# Patient Record
Sex: MALE | Race: WHITE | Employment: FULL TIME | URBAN - METROPOLITAN AREA
[De-identification: names, ages, dates, MRNs, and addresses within clinical notes are randomized per-mention and may not be internally consistent; named-entity substitution may affect disease eponyms.]

---

## 2018-04-18 LAB
ALBUMIN SERPL-MCNC: 4.6 G/DL
ALP BLD-CCNC: 54 U/L
ALT SERPL-CCNC: 78 U/L
ANION GAP SERPL CALCULATED.3IONS-SCNC: NORMAL MMOL/L
AST SERPL-CCNC: 38 U/L
BILIRUB SERPL-MCNC: 0.2 MG/DL (ref 0.1–1.4)
BUN BLDV-MCNC: 8 MG/DL
CALCIUM SERPL-MCNC: 9 MG/DL
CHLORIDE BLD-SCNC: 98 MMOL/L
CHOLESTEROL, TOTAL: 176 MG/DL
CHOLESTEROL/HDL RATIO: ABNORMAL
CO2: 23 MMOL/L
CREAT SERPL-MCNC: 0.7 MG/DL
GFR CALCULATED: 125
GLUCOSE BLD-MCNC: 190 MG/DL
HDLC SERPL-MCNC: 32 MG/DL (ref 35–70)
LDL CHOLESTEROL CALCULATED: 73 MG/DL (ref 0–160)
POTASSIUM SERPL-SCNC: 4.2 MMOL/L
SODIUM BLD-SCNC: 137 MMOL/L
TOTAL PROTEIN: 7.4
TRIGL SERPL-MCNC: 353 MG/DL
VLDLC SERPL CALC-MCNC: 71 MG/DL

## 2019-01-06 ENCOUNTER — HOSPITAL ENCOUNTER (EMERGENCY)
Age: 34
Discharge: HOME | End: 2019-01-06
Payer: COMMERCIAL

## 2019-01-06 VITALS
SYSTOLIC BLOOD PRESSURE: 143 MMHG | RESPIRATION RATE: 16 BRPM | TEMPERATURE: 97.8 F | HEART RATE: 100 BPM | OXYGEN SATURATION: 95 % | BODY MASS INDEX: 36.06 KG/M2 | DIASTOLIC BLOOD PRESSURE: 97 MMHG | HEIGHT: 74 IN | WEIGHT: 281 LBS

## 2019-01-06 DIAGNOSIS — R22.0 LIP SWELLING: Primary | ICD-10-CM

## 2019-01-06 PROCEDURE — 6360000002 HC RX W HCPCS: Performed by: PHYSICIAN ASSISTANT

## 2019-01-06 PROCEDURE — 6370000000 HC RX 637 (ALT 250 FOR IP): Performed by: PHYSICIAN ASSISTANT

## 2019-01-06 PROCEDURE — 99283 EMERGENCY DEPT VISIT LOW MDM: CPT

## 2019-01-06 PROCEDURE — 96372 THER/PROPH/DIAG INJ SC/IM: CPT

## 2019-01-06 RX ORDER — FAMOTIDINE 20 MG/1
20 TABLET, FILM COATED ORAL 2 TIMES DAILY
Qty: 20 TABLET | Refills: 0 | Status: SHIPPED | OUTPATIENT
Start: 2019-01-06 | End: 2019-08-02

## 2019-01-06 RX ORDER — DIPHENHYDRAMINE HYDROCHLORIDE 50 MG/ML
50 INJECTION INTRAMUSCULAR; INTRAVENOUS ONCE
Status: COMPLETED | OUTPATIENT
Start: 2019-01-06 | End: 2019-01-06

## 2019-01-06 RX ORDER — PREDNISONE 20 MG/1
60 TABLET ORAL ONCE
Status: COMPLETED | OUTPATIENT
Start: 2019-01-06 | End: 2019-01-06

## 2019-01-06 RX ORDER — AMOXICILLIN 500 MG/1
500 CAPSULE ORAL 3 TIMES DAILY
Qty: 21 CAPSULE | Refills: 0 | Status: SHIPPED | OUTPATIENT
Start: 2019-01-06 | End: 2019-01-13

## 2019-01-06 RX ORDER — PREDNISONE 20 MG/1
60 TABLET ORAL DAILY
Qty: 12 TABLET | Refills: 0 | Status: SHIPPED | OUTPATIENT
Start: 2019-01-06 | End: 2019-01-10

## 2019-01-06 RX ORDER — DIPHENHYDRAMINE HCL 25 MG
25 CAPSULE ORAL EVERY 6 HOURS PRN
Qty: 20 CAPSULE | Refills: 0 | Status: SHIPPED | OUTPATIENT
Start: 2019-01-06 | End: 2019-01-16

## 2019-01-06 RX ADMIN — PREDNISONE 60 MG: 20 TABLET ORAL at 10:08

## 2019-01-06 RX ADMIN — DIPHENHYDRAMINE HYDROCHLORIDE 50 MG: 50 INJECTION, SOLUTION INTRAMUSCULAR; INTRAVENOUS at 10:08

## 2019-01-06 ASSESSMENT — PAIN DESCRIPTION - LOCATION: LOCATION: FACE

## 2019-01-06 ASSESSMENT — PAIN SCALES - GENERAL: PAINLEVEL_OUTOF10: 5

## 2019-01-06 ASSESSMENT — PAIN DESCRIPTION - PAIN TYPE: TYPE: ACUTE PAIN

## 2019-04-04 LAB
ESTIMATED AVERAGE GLUCOSE: 197.3 MG/DL
HBA1C MFR BLD: 8.5 %

## 2019-07-16 DIAGNOSIS — E78.5 HYPERLIPIDEMIA, UNSPECIFIED HYPERLIPIDEMIA TYPE: ICD-10-CM

## 2019-07-16 DIAGNOSIS — E66.01 CLASS 2 SEVERE OBESITY WITH SERIOUS COMORBIDITY AND BODY MASS INDEX (BMI) OF 36.0 TO 36.9 IN ADULT, UNSPECIFIED OBESITY TYPE (HCC): ICD-10-CM

## 2019-07-16 RX ORDER — LANCETS
1 EACH MISCELLANEOUS DAILY
COMMUNITY

## 2019-07-16 RX ORDER — ATENOLOL 25 MG/1
25 TABLET ORAL 2 TIMES DAILY
COMMUNITY
End: 2019-08-02 | Stop reason: SDUPTHER

## 2019-07-16 RX ORDER — LOSARTAN POTASSIUM 50 MG/1
50 TABLET ORAL 2 TIMES DAILY
COMMUNITY
End: 2019-07-29 | Stop reason: SDUPTHER

## 2019-07-29 ENCOUNTER — TELEPHONE (OUTPATIENT)
Dept: FAMILY MEDICINE CLINIC | Age: 34
End: 2019-07-29

## 2019-07-29 RX ORDER — LOSARTAN POTASSIUM 50 MG/1
50 TABLET ORAL DAILY
Qty: 30 TABLET | Refills: 0 | Status: SHIPPED | OUTPATIENT
Start: 2019-07-29 | End: 2019-07-30 | Stop reason: SDUPTHER

## 2019-07-29 RX ORDER — LOSARTAN POTASSIUM 50 MG/1
TABLET ORAL
Qty: 60 TABLET | Refills: 2 | Status: SHIPPED | OUTPATIENT
Start: 2019-07-29 | End: 2019-07-29 | Stop reason: SDUPTHER

## 2019-07-29 NOTE — TELEPHONE ENCOUNTER
----- Message from Bruna Catherine sent at 7/29/2019 11:11 AM EDT -----  Contact: [phone]  LOSARTAN 50 MG   METFORMIN 1000 MG      [facility]

## 2019-07-30 ENCOUNTER — TELEPHONE (OUTPATIENT)
Dept: FAMILY MEDICINE CLINIC | Age: 34
End: 2019-07-30

## 2019-07-30 RX ORDER — LOSARTAN POTASSIUM 50 MG/1
50 TABLET ORAL 2 TIMES DAILY
Qty: 60 TABLET | Refills: 0 | Status: SHIPPED | OUTPATIENT
Start: 2019-07-30 | End: 2019-08-02 | Stop reason: SDUPTHER

## 2019-07-30 NOTE — TELEPHONE ENCOUNTER
----- Message from Deborah Estrada sent at 7/30/2019 11:53 AM EDT -----  Contact: ETHAN/ANALIA  DIRECTIONS NEED CLARIFIED.  DID YOU DECREASE LOSARTAN?  [phone]

## 2019-07-31 ENCOUNTER — TELEPHONE (OUTPATIENT)
Dept: FAMILY MEDICINE CLINIC | Age: 34
End: 2019-07-31

## 2019-07-31 LAB
A/G RATIO: 2 (ref 1.1–2.2)
ALBUMIN SERPL-MCNC: 4.9 G/DL (ref 3.4–5)
ALP BLD-CCNC: 60 U/L (ref 40–129)
ALT SERPL-CCNC: 151 U/L (ref 10–40)
ANION GAP SERPL CALCULATED.3IONS-SCNC: 15 MMOL/L (ref 3–16)
AST SERPL-CCNC: 71 U/L (ref 15–37)
BILIRUB SERPL-MCNC: 0.4 MG/DL (ref 0–1)
BUN BLDV-MCNC: 12 MG/DL (ref 7–20)
CALCIUM SERPL-MCNC: 10 MG/DL (ref 8.3–10.6)
CHLORIDE BLD-SCNC: 99 MMOL/L (ref 99–110)
CHOLESTEROL, FASTING: 187 MG/DL (ref 0–199)
CO2: 22 MMOL/L (ref 21–32)
CREAT SERPL-MCNC: 0.5 MG/DL (ref 0.9–1.3)
GFR AFRICAN AMERICAN: >60
GFR NON-AFRICAN AMERICAN: >60
GLOBULIN: 2.5 G/DL
GLUCOSE FASTING: 297 MG/DL (ref 70–99)
HDLC SERPL-MCNC: 30 MG/DL (ref 40–60)
LDL CHOLESTEROL CALCULATED: ABNORMAL MG/DL
LDL CHOLESTEROL DIRECT: 112 MG/DL
POTASSIUM SERPL-SCNC: 4.6 MMOL/L (ref 3.5–5.1)
SODIUM BLD-SCNC: 136 MMOL/L (ref 136–145)
TOTAL PROTEIN: 7.4 G/DL (ref 6.4–8.2)
TRIGLYCERIDE, FASTING: 457 MG/DL (ref 0–150)
VLDLC SERPL CALC-MCNC: ABNORMAL MG/DL

## 2019-07-31 NOTE — TELEPHONE ENCOUNTER
----- Message from Melodie Hall sent at 7/31/2019 11:34 AM EDT -----  Contact: ADALBERTO   [phone]     [facility] ON [address]  VERIFY THE \"DIRECTIONS\" CHANGE FROM  LOSARTAN 50 MG BID-------TO SCRIPT  SAYING 50 MG QD.

## 2019-08-01 LAB
ESTIMATED AVERAGE GLUCOSE: 246 MG/DL
HBA1C MFR BLD: 10.2 %

## 2019-08-02 ENCOUNTER — OFFICE VISIT (OUTPATIENT)
Dept: FAMILY MEDICINE CLINIC | Age: 34
End: 2019-08-02
Payer: COMMERCIAL

## 2019-08-02 VITALS
SYSTOLIC BLOOD PRESSURE: 144 MMHG | DIASTOLIC BLOOD PRESSURE: 92 MMHG | HEIGHT: 73 IN | WEIGHT: 288.6 LBS | BODY MASS INDEX: 38.25 KG/M2 | HEART RATE: 76 BPM

## 2019-08-02 DIAGNOSIS — E66.01 CLASS 2 SEVERE OBESITY WITH SERIOUS COMORBIDITY AND BODY MASS INDEX (BMI) OF 36.0 TO 36.9 IN ADULT, UNSPECIFIED OBESITY TYPE (HCC): Primary | ICD-10-CM

## 2019-08-02 DIAGNOSIS — I10 ESSENTIAL HYPERTENSION: Chronic | ICD-10-CM

## 2019-08-02 DIAGNOSIS — E78.5 HYPERLIPIDEMIA, UNSPECIFIED HYPERLIPIDEMIA TYPE: ICD-10-CM

## 2019-08-02 DIAGNOSIS — E11.9 TYPE 2 DIABETES MELLITUS WITHOUT COMPLICATION, WITHOUT LONG-TERM CURRENT USE OF INSULIN (HCC): Chronic | ICD-10-CM

## 2019-08-02 PROCEDURE — 99214 OFFICE O/P EST MOD 30 MIN: CPT | Performed by: FAMILY MEDICINE

## 2019-08-02 RX ORDER — ATORVASTATIN CALCIUM 20 MG/1
20 TABLET, FILM COATED ORAL DAILY
Qty: 90 TABLET | Refills: 1 | Status: SHIPPED
Start: 2019-08-02 | End: 2020-05-29

## 2019-08-02 RX ORDER — LOSARTAN POTASSIUM 50 MG/1
50 TABLET ORAL 2 TIMES DAILY
Qty: 60 TABLET | Refills: 5 | Status: SHIPPED
Start: 2019-08-02 | End: 2020-05-29 | Stop reason: RX

## 2019-08-02 RX ORDER — ATENOLOL 25 MG/1
25 TABLET ORAL 2 TIMES DAILY
Qty: 60 TABLET | Refills: 5 | Status: SHIPPED | OUTPATIENT
Start: 2019-08-02 | End: 2020-05-21 | Stop reason: SDUPTHER

## 2019-08-02 ASSESSMENT — PATIENT HEALTH QUESTIONNAIRE - PHQ9
2. FEELING DOWN, DEPRESSED OR HOPELESS: 0
SUM OF ALL RESPONSES TO PHQ QUESTIONS 1-9: 0
SUM OF ALL RESPONSES TO PHQ9 QUESTIONS 1 & 2: 0
1. LITTLE INTEREST OR PLEASURE IN DOING THINGS: 0
SUM OF ALL RESPONSES TO PHQ QUESTIONS 1-9: 0

## 2019-08-02 ASSESSMENT — ENCOUNTER SYMPTOMS
SHORTNESS OF BREATH: 0
RESPIRATORY NEGATIVE: 1
ABDOMINAL PAIN: 0
CHEST TIGHTNESS: 0
COUGH: 0
WHEEZING: 0

## 2019-08-19 ENCOUNTER — TELEPHONE (OUTPATIENT)
Dept: FAMILY MEDICINE CLINIC | Age: 34
End: 2019-08-19

## 2019-08-30 RX ORDER — GLIMEPIRIDE 1 MG/1
1 TABLET ORAL
Qty: 30 TABLET | Refills: 2 | Status: SHIPPED | OUTPATIENT
Start: 2019-08-30 | End: 2019-09-26 | Stop reason: SDUPTHER

## 2019-09-26 ENCOUNTER — TELEPHONE (OUTPATIENT)
Dept: FAMILY MEDICINE CLINIC | Age: 34
End: 2019-09-26

## 2019-09-26 RX ORDER — GLIMEPIRIDE 1 MG/1
1 TABLET ORAL
Qty: 30 TABLET | Refills: 2 | Status: SHIPPED | OUTPATIENT
Start: 2019-09-26 | End: 2020-04-03

## 2020-05-08 RX ORDER — ATENOLOL 25 MG/1
TABLET ORAL
Qty: 60 TABLET | Refills: 4 | OUTPATIENT
Start: 2020-05-08

## 2020-05-15 RX ORDER — ATENOLOL 25 MG/1
TABLET ORAL
Qty: 60 TABLET | Refills: 4 | OUTPATIENT
Start: 2020-05-15

## 2020-05-21 RX ORDER — ATENOLOL 25 MG/1
25 TABLET ORAL 2 TIMES DAILY
Qty: 60 TABLET | Refills: 0 | Status: SHIPPED | OUTPATIENT
Start: 2020-05-21 | End: 2020-05-29 | Stop reason: SDUPTHER

## 2020-05-27 DIAGNOSIS — E11.9 TYPE 2 DIABETES MELLITUS WITHOUT COMPLICATION, WITHOUT LONG-TERM CURRENT USE OF INSULIN (HCC): Chronic | ICD-10-CM

## 2020-05-27 LAB
CHOLESTEROL, TOTAL: 176 MG/DL (ref 0–199)
HDLC SERPL-MCNC: 38 MG/DL (ref 40–60)
LDL CHOLESTEROL CALCULATED: 97 MG/DL
TRIGL SERPL-MCNC: 205 MG/DL (ref 0–150)
VLDLC SERPL CALC-MCNC: 41 MG/DL

## 2020-05-28 LAB
ESTIMATED AVERAGE GLUCOSE: 151.3 MG/DL
HBA1C MFR BLD: 6.9 %

## 2020-05-29 ENCOUNTER — OFFICE VISIT (OUTPATIENT)
Dept: FAMILY MEDICINE CLINIC | Age: 35
End: 2020-05-29
Payer: COMMERCIAL

## 2020-05-29 VITALS
OXYGEN SATURATION: 98 % | HEART RATE: 84 BPM | DIASTOLIC BLOOD PRESSURE: 90 MMHG | TEMPERATURE: 98.6 F | SYSTOLIC BLOOD PRESSURE: 130 MMHG | WEIGHT: 275.8 LBS | HEIGHT: 73 IN | BODY MASS INDEX: 36.55 KG/M2

## 2020-05-29 PROCEDURE — 90471 IMMUNIZATION ADMIN: CPT | Performed by: FAMILY MEDICINE

## 2020-05-29 PROCEDURE — 90732 PPSV23 VACC 2 YRS+ SUBQ/IM: CPT | Performed by: FAMILY MEDICINE

## 2020-05-29 PROCEDURE — 99214 OFFICE O/P EST MOD 30 MIN: CPT | Performed by: FAMILY MEDICINE

## 2020-05-29 RX ORDER — ATENOLOL 25 MG/1
25 TABLET ORAL 2 TIMES DAILY
Qty: 60 TABLET | Refills: 5 | Status: SHIPPED | OUTPATIENT
Start: 2020-05-29 | End: 2021-01-04

## 2020-05-29 RX ORDER — BLOOD-GLUCOSE METER
EACH MISCELLANEOUS
Qty: 1 KIT | Refills: 0 | Status: SHIPPED | OUTPATIENT
Start: 2020-05-29 | End: 2022-08-09

## 2020-05-29 RX ORDER — GLIMEPIRIDE 1 MG/1
TABLET ORAL
Qty: 30 TABLET | Refills: 5 | Status: SHIPPED | OUTPATIENT
Start: 2020-05-29 | End: 2020-12-03

## 2020-05-29 RX ORDER — LANCETS
EACH MISCELLANEOUS
Qty: 100 EACH | Refills: 5 | Status: SHIPPED | OUTPATIENT
Start: 2020-05-29

## 2020-05-29 RX ORDER — BLOOD SUGAR DIAGNOSTIC
1 STRIP MISCELLANEOUS DAILY
Qty: 50 EACH | Refills: 5 | Status: SHIPPED | OUTPATIENT
Start: 2020-05-29

## 2020-05-29 ASSESSMENT — ENCOUNTER SYMPTOMS
DIARRHEA: 0
COUGH: 0
ALLERGIC/IMMUNOLOGIC NEGATIVE: 1
SHORTNESS OF BREATH: 0
SORE THROAT: 0
RHINORRHEA: 0

## 2020-05-29 ASSESSMENT — PATIENT HEALTH QUESTIONNAIRE - PHQ9
2. FEELING DOWN, DEPRESSED OR HOPELESS: 0
1. LITTLE INTEREST OR PLEASURE IN DOING THINGS: 0
SUM OF ALL RESPONSES TO PHQ9 QUESTIONS 1 & 2: 0
SUM OF ALL RESPONSES TO PHQ QUESTIONS 1-9: 0
SUM OF ALL RESPONSES TO PHQ QUESTIONS 1-9: 0

## 2020-05-29 NOTE — PROGRESS NOTES
2020     Moises Godinez      Chief Complaint   Patient presents with    Other     Routine office visit    Discuss Labs     Drawn 20    Medication Refill     Pended    Medication Problem     Losartan is now unaval. he has not had it for about 6 months now       YAKOV Funes is a 29 y.o. male who presents today with the followin. Type 2 diabetes mellitus without complication, without long-term current use of insulin ([address]    2. Need for prophylactic vaccination against Streptococcus pneumoniae (pneumococcus)    3. Need for prophylactic vaccination against diphtheria-tetanus-pertussis (DTP)    4. Essential hypertension    5. Hyperlipidemia, unspecified hyperlipidemia type    6. Class 2 severe obesity with serious comorbidity and body mass index (BMI) of 36.0 to 36.9 in adult, unspecified obesity type ([address]    Here for follow-up of several medical problems  The patient is a type II diabetic  When he was here in the last 6 months his A1c was around 10  I try to get him on Trulicity and Januvia was not covered so I decided put on Amaryl 1 mg daily and has been on that in addition to the maximum dose of metformin  He is lost a little bit of weight  He does have any signs or symptoms of hypoglycemia he does not have polyuria or polydipsia  He does not check his blood sugars at home    REVIEW OF SYMPTOMS    Review of Systems   Constitutional: Positive for activity change. Negative for unexpected weight change. HENT: Negative for congestion, rhinorrhea and sore throat. Respiratory: Negative for cough and shortness of breath. Cardiovascular: Negative for chest pain. Gastrointestinal: Negative for diarrhea. Genitourinary: Negative for difficulty urinating. Allergic/Immunologic: Negative. Neurological: Negative. Psychiatric/Behavioral: Negative.         PAST MEDICAL HISTORY  Past Medical History:   Diagnosis Date    Diabetes mellitus ([address]     Hyperlipidemia     Hypertension     glucose test strips (ACCU-CHEK GUIDE) strip 1 each by In Vitro route daily As needed. 50 each 5    Accu-Chek Multiclix Lancets MISC Check blood sugar once per day 100 each 5    blood glucose test strips (ACCU-CHEK MERRICK PLUS) strip 1 each by In Vitro route daily as needed As needed.  ACCU-CHEK SOFTCLIX LANCETS MISC 1 each by Does not apply route daily       No current facility-administered medications for this visit. ALLERGIES  Allergies   Allergen Reactions    Orajel Mouth-Aid [Benzocaine] Swelling       PHYSICAL EXAM    BP (!) 130/90 (Site: Left Upper Arm, Position: Sitting, Cuff Size: Large Adult)   Pulse 84   Temp 98.6 °F (37 °C) (Temporal)   Ht 6' 0.5\" (1.842 m)   Wt 275 lb 12.8 oz (125.1 kg)   SpO2 98%   BMI 36.89 kg/m²     Physical Exam  Vitals signs and nursing note reviewed. Constitutional:       General: He is not in acute distress. HENT:      Right Ear: External ear normal.      Left Ear: External ear normal.   Eyes:      Conjunctiva/sclera: Conjunctivae normal.   Cardiovascular:      Rate and Rhythm: Normal rate. Pulmonary:      Effort: Pulmonary effort is normal. No respiratory distress. Musculoskeletal:      Right lower leg: No edema. Left lower leg: No edema. Skin:     General: Skin is warm and dry. Neurological:      General: No focal deficit present. Mental Status: He is alert. Mental status is at baseline.    Psychiatric:         Mood and Affect: Mood normal.     DIABETIC FOOT EXAM:    Visual inspection:      Deformity/amputation: absent  Skin lesions/pre-ulcerative calluses: absent  Edema: right- negative, left- negative    Sensory exam:  Monofilament sensation: normal  (minimum of 5 random plantar locations tested, avoiding callused areas - > 1 area with absence of sensation is + for neuropathy)    Plus at least one of the following:  Pulses: normal    A1c came back 6.9  Lipids are in the target range       ASSESSMENT and Alishachris Goinsbernie was seen today for other, discuss labs, medication refill and medication problem. Diagnoses and all orders for this visit:    Type 2 diabetes mellitus without complication, without long-term current use of insulin ([facility])  -      DIABETES FOOT EXAM  -     Blood Glucose Monitoring Suppl (ACCU-CHEK GUIDE) w/Device KIT; Use as directed  -     blood glucose test strips (ACCU-CHEK GUIDE) strip; 1 each by In Vitro route daily As needed. -     Accu-Chek Multiclix Lancets MISC; Check blood sugar once per day  -     Comprehensive Metabolic Panel; Future  -     Lipid Panel; Future  -     Hemoglobin A1C; Future  -     Microalbumin / Creatinine Urine Ratio; Future    Need for prophylactic vaccination against Streptococcus pneumoniae (pneumococcus)  -     Pneumococcal polysaccharide vaccine 23-valent PPSV23    Need for prophylactic vaccination against diphtheria-tetanus-pertussis (DTP)    Essential hypertension    Hyperlipidemia, unspecified hyperlipidemia type    Class 2 severe obesity with serious comorbidity and body mass index (BMI) of 36.0 to 36.9 in adult, unspecified obesity type ([facility]    Other orders  -     metFORMIN (GLUCOPHAGE) 1000 MG tablet; Take 1 tablet by mouth 2 times daily  -     glimepiride (AMARYL) 1 MG tablet; TAKE ONE TABLET BY MOUTH EVERY MORNING (BEFORE BREAKFAST)  -     atenolol (TENORMIN) 25 MG tablet; Take 1 tablet by mouth 2 times daily    Patient is improved significantly on his diabetes and should continue on the same medicines continue to try to lose weight  Continue same meds for the hypertension and hyperlipidemia follow-up in 6 months get labs prior to that office visit    Return in about 6 months (around 11/29/2020). Electronically signed by Kulwant Bruner MD on 5/29/2020    Please note that this chart was generated using dragon dictation software. Although every effort was made to ensure the accuracy of this automated transcription, some errors in transcription may have occurred.

## 2020-11-12 ENCOUNTER — HOSPITAL ENCOUNTER (OUTPATIENT)
Age: 35
Setting detail: SPECIMEN
Discharge: HOME | End: 2020-11-12
Payer: COMMERCIAL

## 2020-11-12 ENCOUNTER — OFFICE VISIT (OUTPATIENT)
Dept: FAMILY MEDICINE CLINIC | Age: 35
End: 2020-11-12
Payer: COMMERCIAL

## 2020-11-12 VITALS — TEMPERATURE: 97.9 F

## 2020-11-12 PROCEDURE — U0002 COVID-19 LAB TEST NON-CDC: HCPCS

## 2020-11-12 PROCEDURE — 99213 OFFICE O/P EST LOW 20 MIN: CPT | Performed by: PHYSICIAN ASSISTANT

## 2020-11-12 NOTE — PATIENT INSTRUCTIONS
Your COVID 19 test can take 3-5 days for the results come back. We ask that you make a Mychart page and view your test results this way. You will need to Self quarantine until you know your results. Increase fluids rest  Saline nasal spray as directed  Warm salt gargles for throat discomfort  Monitor temperature twice a day  Tylenol for fevers and/or discomfort. Mucinex as needed  If symptoms are worse -Go to the ER. Follow up with your primary doctor    To Whom it May Concern:    Teresa Johnson has been tested for COVID on 11/12/20. They may NOT return to work until their lab test results back and they been fever free for 3 days. If test is positive they must stay home for 2 weeks or until they test negative or as directed by the [facility] Department.

## 2020-11-12 NOTE — PROGRESS NOTES
11/12/20  Gavin Godinez  1985    FLU/COVID-19 CLINIC EVALUATION    HPI SYMPTOMS:    Employer:    [] Fevers  [x] Chills  [x] Cough  [] Coughing up blood  [] Chest Congestion  [x] Nasal Congestion  [] Feeling short of breath  [] Sometimes  [] Frequently  [] All the time  [] Chest pain  [x] Headaches  [x]Tolerable  [] Severe  [] Sore throat  [x] Muscle aches  [x] Nausea  [] Vomiting  []Unable to keep fluids down  [x] Diarrhea  []Severe    [x] OTHER SYMPTOMS: loss of taste and smell    Symptom Duration:   [] 1  [] 2   [] 3   [x] 4    [] 5   [] 6   [] 7   [] 8   [] 9   [] 10   [] 11   [] 12   [] 13   [] 14   [] Longer than 14 days    Symptom course:   [x] Worsening     [] Stable     [] Improving    RISK FACTORS:    [] Pregnant or possibly pregnant  [] Age over 61  [] Diabetes  [] Heart disease  [] Asthma  [] COPD/Other chronic lung diseases  [] Active Cancer  [] On Chemotherapy  [] Taking oral steroids  [] History Lymphoma/Leukemia  [] Close contact with a lab confirmed COVID-19 patient within 14 days of symptom onset  [] History of travel from affected geographical areas within 14 days of symptom onset       VITALS:  There were no vitals filed for this visit. TESTS:    POCT FLU:  [] Positive     []Negative    ASSESSMENT:    [] Flu  [] Possible COVID-19  [] Strep    PLAN:    [] Discharge home with written instructions for:  [] Flu management  [] Possible COVID-19 infection with self-quarantine and management of symptoms  [] Follow-up with primary care physician or emergency department if worsens  [] Evaluation per physician/NP/PA in clinic  [] Sent to ER       An  electronic signature was used to authenticate this note.      --Zainab Anna LPN on [id number]/5999 at 6:54 PM

## 2020-11-13 NOTE — PROGRESS NOTES
11/12/2020    HPI:  Chief complaint and history of present illness as per medical assistant/nurse documented today in the Flu/COVID-19 clinic. 4-day history of chills, cough, nasal congestion, mild generalized headache, muscle aches, nausea, diarrhea, and lack of taste and smell. He denies chest pain, shortness of breath, wheezing, vomiting, fever, melena or hematochezia. His girlfriend lives in his home and has had similar symptoms. He has also had known exposure to COVID-19 (friend) within the past 5 days. He has not tried any medications for his symptoms. MEDICATIONS:  Prior to Visit Medications    Medication Sig Taking? Authorizing Provider   metFORMIN (GLUCOPHAGE) 1000 MG tablet Take 1 tablet by mouth 2 times daily  Andree Connell MD   glimepiride (AMARYL) 1 MG tablet TAKE ONE TABLET BY MOUTH EVERY MORNING (BEFORE BREAKFAST)  Andree Connell MD   atenolol (TENORMIN) 25 MG tablet Take 1 tablet by mouth 2 times daily  Andree Connell MD   Blood Glucose Monitoring Suppl (ACCU-CHEK GUIDE) w/Device KIT Use as directed  Andree Connell MD   blood glucose test strips (ACCU-CHEK GUIDE) strip 1 each by In Vitro route daily As needed. Andree Connell MD   Accu-Chek Multiclix Lancets MISC Check blood sugar once per day  Andree Connell MD   blood glucose test strips (ACCU-CHEK MERRICK PLUS) strip 1 each by In Vitro route daily as needed As needed.   Historical Provider, MD   ACCU-CHEAUTUMN SOFTCLIX LANCETS MISC 1 each by Does not apply route daily  Historical Provider, MD       Allergies   Allergen Reactions    Orajel Mouth-Aid [Benzocaine] Swelling   ,   Past Medical History:   Diagnosis Date    Diabetes mellitus ([facility]     Hyperlipidemia     Hypertension     Obesity        PHYSICAL EXAM:  Physical Exam  Temp:  97.9 F  Heart Rate:  84    Pulse Ox:  98%    Constitutional:  Well developed, well nourished  HENT:  Normocephalic, atraumatic, bilateral external ears normal, bilateral TMs normal, oropharynx moist, post nasal drip noted, nasal mucosa congested, sinuses nontender  Eyes:  conjunctiva normal, no discharge, no scleral icterus  Cardiovascular:  Normal heart rate, normal rhythm, no murmurs, gallops or rubs  Thorax & Lungs:  Normal breath sounds, no respiratory distress, no wheezing  Skin:  Warm, dry, no erythema, no rash  Neurologic:  Alert & oriented   Psychiatric:  Affect normal, mood normal    ASSESSMENT/PLAN:  1. Flu-like symptoms  - COVID-19 Ambulatory    2. Exposure to COVID-19 virus    Patient was encouraged to rest and stay well-hydrated. Use over-the-counter medications for symptom relief such as Tylenol and Mucinex. Isolation measures discussed in detail. ED for any sudden changes. FOLLOW-UP:  No follow-ups on file.     In addition to other information, the printed after visit summary provided to the patient includes:  [x] COVID-19 Self care instructions  [x] COVID-19 General patient information    [facility]

## 2020-11-14 LAB
SARS-COV-2: DETECTED
SOURCE: ABNORMAL

## 2020-12-03 RX ORDER — GLIMEPIRIDE 1 MG/1
TABLET ORAL
Qty: 30 TABLET | Refills: 4 | Status: SHIPPED | OUTPATIENT
Start: 2020-12-03 | End: 2021-01-22 | Stop reason: SDUPTHER

## 2021-01-04 RX ORDER — ATENOLOL 25 MG/1
TABLET ORAL
Qty: 60 TABLET | Refills: 0 | Status: SHIPPED | OUTPATIENT
Start: 2021-01-04 | End: 2021-01-22 | Stop reason: SDUPTHER

## 2021-01-04 NOTE — TELEPHONE ENCOUNTER
Requested Prescriptions     Signed Prescriptions Disp Refills    atenolol (TENORMIN) 25 MG tablet 60 tablet 0     Sig: TAKE ONE TABLET BY MOUTH TWICE A DAY     Authorizing Provider: Hemalatha Hoffman     Ordering User: BILL Hernández    metFORMIN (GLUCOPHAGE) 1000 MG tablet 60 tablet 0     Sig: TAKE ONE TABLET BY MOUTH TWICE A DAY     Authorizing Provider: Hemalatha Hoffman     Ordering User: Yamil Marquez

## 2021-01-20 DIAGNOSIS — E11.9 TYPE 2 DIABETES MELLITUS WITHOUT COMPLICATION, WITHOUT LONG-TERM CURRENT USE OF INSULIN (HCC): Chronic | ICD-10-CM

## 2021-01-20 LAB
A/G RATIO: 1.7 (ref 1.1–2.2)
ALBUMIN SERPL-MCNC: 5 G/DL (ref 3.4–5)
ALP BLD-CCNC: 66 U/L (ref 40–129)
ALT SERPL-CCNC: 217 U/L (ref 10–40)
ANION GAP SERPL CALCULATED.3IONS-SCNC: 16 MMOL/L (ref 3–16)
AST SERPL-CCNC: 156 U/L (ref 15–37)
BILIRUB SERPL-MCNC: 0.6 MG/DL (ref 0–1)
BUN BLDV-MCNC: 8 MG/DL (ref 7–20)
CALCIUM SERPL-MCNC: 9.7 MG/DL (ref 8.3–10.6)
CHLORIDE BLD-SCNC: 98 MMOL/L (ref 99–110)
CHOLESTEROL, TOTAL: 189 MG/DL (ref 0–199)
CO2: 22 MMOL/L (ref 21–32)
CREAT SERPL-MCNC: 0.6 MG/DL (ref 0.9–1.3)
CREATININE URINE: 154.9 MG/DL (ref 39–259)
GFR AFRICAN AMERICAN: >60
GFR NON-AFRICAN AMERICAN: >60
GLOBULIN: 2.9 G/DL
GLUCOSE BLD-MCNC: 301 MG/DL (ref 70–99)
HDLC SERPL-MCNC: 30 MG/DL (ref 40–60)
LDL CHOLESTEROL CALCULATED: 101 MG/DL
MICROALBUMIN UR-MCNC: 27.1 MG/DL
MICROALBUMIN/CREAT UR-RTO: 175 MG/G (ref 0–30)
POTASSIUM SERPL-SCNC: 4 MMOL/L (ref 3.5–5.1)
SODIUM BLD-SCNC: 136 MMOL/L (ref 136–145)
TOTAL PROTEIN: 7.9 G/DL (ref 6.4–8.2)
TRIGL SERPL-MCNC: 291 MG/DL (ref 0–150)
VLDLC SERPL CALC-MCNC: 58 MG/DL

## 2021-01-21 LAB
ESTIMATED AVERAGE GLUCOSE: 260.4 MG/DL
HBA1C MFR BLD: 10.7 %

## 2021-01-22 ENCOUNTER — OFFICE VISIT (OUTPATIENT)
Dept: FAMILY MEDICINE CLINIC | Age: 36
End: 2021-01-22
Payer: COMMERCIAL

## 2021-01-22 VITALS
HEART RATE: 64 BPM | HEIGHT: 73 IN | SYSTOLIC BLOOD PRESSURE: 140 MMHG | WEIGHT: 278.8 LBS | BODY MASS INDEX: 36.95 KG/M2 | OXYGEN SATURATION: 95 % | TEMPERATURE: 98.1 F | DIASTOLIC BLOOD PRESSURE: 94 MMHG

## 2021-01-22 DIAGNOSIS — E11.9 TYPE 2 DIABETES MELLITUS WITHOUT COMPLICATION, WITHOUT LONG-TERM CURRENT USE OF INSULIN (HCC): Chronic | ICD-10-CM

## 2021-01-22 DIAGNOSIS — U07.1 COVID-19: ICD-10-CM

## 2021-01-22 DIAGNOSIS — I10 ESSENTIAL HYPERTENSION: Primary | Chronic | ICD-10-CM

## 2021-01-22 PROCEDURE — 99214 OFFICE O/P EST MOD 30 MIN: CPT | Performed by: FAMILY MEDICINE

## 2021-01-22 RX ORDER — GLIMEPIRIDE 2 MG/1
TABLET ORAL
Qty: 30 TABLET | Refills: 3 | Status: SHIPPED | OUTPATIENT
Start: 2021-01-22 | End: 2021-04-30 | Stop reason: SDUPTHER

## 2021-01-22 RX ORDER — ATENOLOL 25 MG/1
TABLET ORAL
Qty: 180 TABLET | Refills: 1 | Status: SHIPPED | OUTPATIENT
Start: 2021-01-22 | End: 2021-08-30

## 2021-01-22 ASSESSMENT — ENCOUNTER SYMPTOMS
SORE THROAT: 0
SHORTNESS OF BREATH: 0

## 2021-01-22 ASSESSMENT — PATIENT HEALTH QUESTIONNAIRE - PHQ9: 1. LITTLE INTEREST OR PLEASURE IN DOING THINGS: 0

## 2021-01-22 NOTE — PROGRESS NOTES
2021     Tamera Godinez      Chief Complaint   Patient presents with    6 Month Follow-Up    Discuss Labs     21    Medication Refill     Pended       HPI      Deysi Burton is a 28 y.o. male who presents today with the followin. Essential hypertension    2. Type 2 diabetes mellitus without complication, without long-term current use of insulin (HCC)    3. COVID-19    Routine follow-up  The patient is a type II diabetic      REVIEW OF SYMPTOMS    Review of Systems   Constitutional: Negative for activity change and unexpected weight change. Patient had COVID-19 infection symptoms were mild and has resolved   HENT: Negative for congestion and sore throat. Respiratory: Negative for shortness of breath. Cardiovascular:        History of essential hypertension responding well to his medicine  He has no angina and no known heart disease   Endocrine:        Diabetes type 2 which is currently not under control  He is trying to lose weight  He has any signs or symptoms of hypoglycemia  He was started on low-dose sulfonylurea after his last A1c       PAST MEDICAL HISTORY  Past Medical History:   Diagnosis Date    [facility] mellitus [facility]     Hyperlipidemia     Hypertension     Obesity        FAMILY HISTORY  Family History   Problem Relation Age of Onset    Diabetes Father     Hypertension Other        SOCIAL HISTORY  Social History     Socioeconomic History    Marital status:      Spouse name: None    Number of children: None    Years of education: None    Highest education level: None   Occupational History    None   Social Needs    Financial resource strain: None    Food insecurity     Worry: None     Inability: None    Transportation needs     Medical: None     Non-medical: None   Tobacco Use    Smoking status: Never Smoker    Smokeless tobacco: Never Used   Substance and Sexual Activity    Alcohol use:  Yes    Drug use: Yes     Types: Marijuana    Sexual activity: None Lifestyle    Physical activity     Days per week: None     Minutes per session: None    Stress: None   Relationships    Social connections     Talks on phone: None     Gets together: None     Attends Sabianism service: None     Active member of club or organization: None     Attends meetings of clubs or organizations: None     Relationship status: None    Intimate partner violence     Fear of current or ex partner: None     Emotionally abused: None     Physically abused: None     Forced sexual activity: None   Other Topics Concern    None   Social History Narrative    None        SURGICAL HISTORY  Past Surgical History:   Procedure Laterality Date    FRACTURE SURGERY      JOINT REPLACEMENT      WISDOM TOOTH EXTRACTION         CURRENT MEDICATIONS  Current Outpatient Medications   Medication Sig Dispense Refill    atenolol (TENORMIN) 25 MG tablet TAKE ONE TABLET BY MOUTH TWICE A DAY 60 tablet 0    metFORMIN (GLUCOPHAGE) 1000 MG tablet TAKE ONE TABLET BY MOUTH TWICE A DAY 60 tablet 0    glimepiride (AMARYL) 1 MG tablet TAKE ONE TABLET BY MOUTH EVERY MORNING BEFORE BREAKFAST 30 tablet 4    Blood Glucose Monitoring Suppl (ACCU-CHEK GUIDE) w/Device KIT Use as directed 1 kit 0    blood glucose test strips (ACCU-CHEK GUIDE) strip 1 each by In Vitro route daily As needed. 50 each 5    Accu-Chek Multiclix Lancets MISC Check blood sugar once per day 100 each 5    ACCU-CHEK SOFTCLIX LANCETS MISC 1 each by Does not apply route daily       No current facility-administered medications for this visit. ALLERGIES  Allergies   Allergen Reactions    Orajel Mouth-Aid [Benzocaine] Swelling       PHYSICAL EXAM    BP (!) 140/94 (Site: Right Upper Arm, Position: Sitting, Cuff Size: Large Adult)   Pulse 64   Temp 98.1 °F (36.7 °C) (Temporal)   Ht 6' 0.5\" (1.842 m)   Wt 278 lb 12.8 oz (126.5 kg)   SpO2 95%   BMI 37.29 kg/m²     Physical Exam  Vitals signs and nursing note reviewed.    Constitutional: Appearance: Normal appearance. Cardiovascular:      Rate and Rhythm: Normal rate. Pulmonary:      Effort: Pulmonary effort is normal. No respiratory distress. Neurological:      Mental Status: He is alert. Reviewed his labs done earlier this week  His A1c was disappointingly high at above 10    ASSESSMENT and Albino Nolen was seen today for 6 month follow-up, discuss labs and medication refill. Diagnoses and all orders for this visit:    Essential hypertension    Type 2 diabetes mellitus without complication, without long-term current use of insulin ([facility]    COVID-19    The patient cannot afford some of the newer pharmaceutical such as Marlyce Favor or Trulicity  I am going to increase the glimepiride to 2 mg daily  Watch for hypoglycemia  Make an effort to lose weight watch diet increase exercise  Recheck A1c in 3 months  Commend Covid vaccine when his age group is called    Return in about 3 months (around 4/22/2021). Electronically signed by Hector Wilks MD on 1/22/2021    Please note that this chart was generated using dragon dictation software. Although every effort was made to ensure the accuracy of this automated transcription, some errors in transcription may have occurred.

## 2021-04-30 ENCOUNTER — VIRTUAL VISIT (OUTPATIENT)
Dept: FAMILY MEDICINE CLINIC | Age: 36
End: 2021-04-30
Payer: COMMERCIAL

## 2021-04-30 DIAGNOSIS — U07.1 COVID-19: Primary | ICD-10-CM

## 2021-04-30 DIAGNOSIS — I10 ESSENTIAL HYPERTENSION: Chronic | ICD-10-CM

## 2021-04-30 DIAGNOSIS — E11.9 TYPE 2 DIABETES MELLITUS WITHOUT COMPLICATION, WITHOUT LONG-TERM CURRENT USE OF INSULIN (HCC): Chronic | ICD-10-CM

## 2021-04-30 DIAGNOSIS — E78.5 HYPERLIPIDEMIA, UNSPECIFIED HYPERLIPIDEMIA TYPE: ICD-10-CM

## 2021-04-30 DIAGNOSIS — E66.01 CLASS 2 SEVERE OBESITY WITH SERIOUS COMORBIDITY AND BODY MASS INDEX (BMI) OF 36.0 TO 36.9 IN ADULT, UNSPECIFIED OBESITY TYPE (HCC): ICD-10-CM

## 2021-04-30 PROCEDURE — 99213 OFFICE O/P EST LOW 20 MIN: CPT | Performed by: FAMILY MEDICINE

## 2021-04-30 RX ORDER — GLIMEPIRIDE 2 MG/1
TABLET ORAL
Qty: 90 TABLET | Refills: 1 | Status: SHIPPED | OUTPATIENT
Start: 2021-04-30 | End: 2021-12-08

## 2021-04-30 NOTE — PROGRESS NOTES
2021    TELEHEALTH EVALUATION -- [facility]Visual [facility]    HPI:    Ana Haynes (:  1985) has requested an audio/video evaluation for the following concern(s):    Virtual 3-month follow-up visit  The patient is diabetic  His treatment plan has been restricted because of cost issues and insurance coverage  He has been on Metformin and glimepiride  Glimepiride dose was increased after his last A1c which was above 10  He reports his fasting blood sugars are between 105 and 115 which is true would be markedly improved  He has any signs or symptoms or documentation of hypoglycemia  Review of Systems   Cardiovascular:        He is on beta-blocker for hypertension       Prior to Visit Medications    Medication Sig Taking? Authorizing Provider   atenolol (TENORMIN) 25 MG tablet TAKE ONE TABLET BY MOUTH TWICE A DAY Yes Claudio Mccracken MD   glimepiride (AMARYL) 2 MG tablet TAKE ONE TABLET BY MOUTH EVERY MORNING BEFORE BREAKFAST Yes Claudio Mccracken MD   metFORMIN (GLUCOPHAGE) 1000 MG tablet TAKE ONE TABLET BY MOUTH TWICE A DAY Yes Claudio Mccracken MD   Blood Glucose Monitoring Suppl (ACCU-CHEK GUIDE) w/Device KIT Use as directed Yes Claudio Mccracken MD   blood glucose test strips (ACCU-CHEK GUIDE) strip 1 each by In Vitro route daily As needed. Yes Claudio Mccracken MD   Accu-Chek Multiclix Lancets MISC Check blood sugar once per day Yes Claudio Mccracken MD   ACCU-CHEK SOFTCLIX LANCETS MISC 1 each by Does not apply route daily Yes Historical Provider, MD       Social History     Tobacco Use    Smoking status: Never Smoker    Smokeless tobacco: Never Used   Substance Use Topics    Alcohol use:  Yes    Drug use: Yes     Types: Marijuana            PHYSICAL EXAMINATION:  [ INSTRUCTIONS:  \"[x]\" Indicates a positive item  \"[]\" Indicates a negative item  -- DELETE ALL ITEMS NOT EXAMINED]  Vital Signs: (As obtained by patient/caregiver or practitioner observation)

## 2021-06-22 ENCOUNTER — TELEPHONE (OUTPATIENT)
Dept: FAMILY MEDICINE CLINIC | Age: 36
End: 2021-06-22

## 2021-07-20 ENCOUNTER — TELEPHONE (OUTPATIENT)
Dept: FAMILY MEDICINE CLINIC | Age: 36
End: 2021-07-20

## 2021-08-30 RX ORDER — ATENOLOL 25 MG/1
TABLET ORAL
Qty: 180 TABLET | Refills: 1 | Status: SHIPPED | OUTPATIENT
Start: 2021-08-30 | End: 2022-03-24

## 2021-08-30 NOTE — TELEPHONE ENCOUNTER
Pt needs soon as possible. He is out of these and is leaving for vacation on Wednesday at 6 am and needs these before.  Thank you

## 2021-08-31 RX ORDER — ATENOLOL 25 MG/1
TABLET ORAL
Qty: 180 TABLET | Refills: 1 | OUTPATIENT
Start: 2021-08-31

## 2021-08-31 RX ORDER — GLIMEPIRIDE 2 MG/1
TABLET ORAL
Qty: 90 TABLET | Refills: 1 | OUTPATIENT
Start: 2021-08-31

## 2021-12-08 RX ORDER — GLIMEPIRIDE 2 MG/1
TABLET ORAL
Qty: 14 TABLET | Refills: 0 | Status: SHIPPED | OUTPATIENT
Start: 2021-12-08 | End: 2022-03-31 | Stop reason: SDUPTHER

## 2022-03-24 RX ORDER — ATENOLOL 25 MG/1
TABLET ORAL
Qty: 14 TABLET | Refills: 0 | Status: SHIPPED | OUTPATIENT
Start: 2022-03-24 | End: 2022-03-31 | Stop reason: SDUPTHER

## 2022-03-30 DIAGNOSIS — E11.9 TYPE 2 DIABETES MELLITUS WITHOUT COMPLICATION, WITHOUT LONG-TERM CURRENT USE OF INSULIN (HCC): ICD-10-CM

## 2022-03-30 DIAGNOSIS — R79.89 LFT ELEVATION: ICD-10-CM

## 2022-03-30 DIAGNOSIS — E11.9 TYPE 2 DIABETES MELLITUS WITHOUT COMPLICATION, WITHOUT LONG-TERM CURRENT USE OF INSULIN (HCC): Primary | ICD-10-CM

## 2022-03-30 DIAGNOSIS — E78.5 HYPERLIPIDEMIA, UNSPECIFIED HYPERLIPIDEMIA TYPE: ICD-10-CM

## 2022-03-30 DIAGNOSIS — I10 ESSENTIAL HYPERTENSION: Chronic | ICD-10-CM

## 2022-03-30 LAB
BASOPHILS ABSOLUTE: 0 K/UL (ref 0–0.2)
BASOPHILS RELATIVE PERCENT: 0.7 %
CREATININE URINE: 94.9 MG/DL (ref 39–259)
EOSINOPHILS ABSOLUTE: 0.2 K/UL (ref 0–0.6)
EOSINOPHILS RELATIVE PERCENT: 2.2 %
HCT VFR BLD CALC: 45.7 % (ref 40.5–52.5)
HEMOGLOBIN: 15.8 G/DL (ref 13.5–17.5)
LYMPHOCYTES ABSOLUTE: 2.4 K/UL (ref 1–5.1)
LYMPHOCYTES RELATIVE PERCENT: 34.3 %
MCH RBC QN AUTO: 29 PG (ref 26–34)
MCHC RBC AUTO-ENTMCNC: 34.6 G/DL (ref 31–36)
MCV RBC AUTO: 83.9 FL (ref 80–100)
MICROALBUMIN UR-MCNC: 4.3 MG/DL
MICROALBUMIN/CREAT UR-RTO: 45.3 MG/G (ref 0–30)
MONOCYTES ABSOLUTE: 0.5 K/UL (ref 0–1.3)
MONOCYTES RELATIVE PERCENT: 6.7 %
NEUTROPHILS ABSOLUTE: 4 K/UL (ref 1.7–7.7)
NEUTROPHILS RELATIVE PERCENT: 56.1 %
PDW BLD-RTO: 14.2 % (ref 12.4–15.4)
PLATELET # BLD: 184 K/UL (ref 135–450)
PMV BLD AUTO: 9 FL (ref 5–10.5)
RBC # BLD: 5.44 M/UL (ref 4.2–5.9)
WBC # BLD: 7.1 K/UL (ref 4–11)

## 2022-03-31 ENCOUNTER — OFFICE VISIT (OUTPATIENT)
Dept: FAMILY MEDICINE CLINIC | Age: 37
End: 2022-03-31
Payer: COMMERCIAL

## 2022-03-31 VITALS
WEIGHT: 258 LBS | SYSTOLIC BLOOD PRESSURE: 164 MMHG | DIASTOLIC BLOOD PRESSURE: 112 MMHG | HEART RATE: 90 BPM | BODY MASS INDEX: 34.19 KG/M2 | OXYGEN SATURATION: 95 % | HEIGHT: 73 IN

## 2022-03-31 DIAGNOSIS — R74.01 TRANSAMINITIS: ICD-10-CM

## 2022-03-31 DIAGNOSIS — I10 ESSENTIAL HYPERTENSION: Primary | ICD-10-CM

## 2022-03-31 DIAGNOSIS — E11.9 TYPE 2 DIABETES MELLITUS WITHOUT COMPLICATION, WITHOUT LONG-TERM CURRENT USE OF INSULIN (HCC): ICD-10-CM

## 2022-03-31 DIAGNOSIS — R79.89 LFT ELEVATION: ICD-10-CM

## 2022-03-31 DIAGNOSIS — E78.5 HYPERLIPIDEMIA, UNSPECIFIED HYPERLIPIDEMIA TYPE: ICD-10-CM

## 2022-03-31 DIAGNOSIS — E11.9 TYPE 2 DIABETES MELLITUS WITHOUT COMPLICATION, WITHOUT LONG-TERM CURRENT USE OF INSULIN (HCC): Primary | ICD-10-CM

## 2022-03-31 LAB
A/G RATIO: 2.2 (ref 1.1–2.2)
ALBUMIN SERPL-MCNC: 5 G/DL (ref 3.4–5)
ALP BLD-CCNC: 65 U/L (ref 40–129)
ALT SERPL-CCNC: 149 U/L (ref 10–40)
ANION GAP SERPL CALCULATED.3IONS-SCNC: 20 MMOL/L (ref 3–16)
AST SERPL-CCNC: 79 U/L (ref 15–37)
BILIRUB SERPL-MCNC: 0.3 MG/DL (ref 0–1)
BUN BLDV-MCNC: 9 MG/DL (ref 7–20)
CALCIUM SERPL-MCNC: 9.8 MG/DL (ref 8.3–10.6)
CHLORIDE BLD-SCNC: 99 MMOL/L (ref 99–110)
CHOLESTEROL, FASTING: 211 MG/DL (ref 0–199)
CO2: 21 MMOL/L (ref 21–32)
CREAT SERPL-MCNC: 0.5 MG/DL (ref 0.9–1.3)
GFR AFRICAN AMERICAN: >60
GFR NON-AFRICAN AMERICAN: >60
GLUCOSE BLD-MCNC: 292 MG/DL (ref 70–99)
HBA1C MFR BLD: 10.3 %
HDLC SERPL-MCNC: 33 MG/DL (ref 40–60)
HEPATITIS C ANTIBODY INTERPRETATION: NORMAL
LDL CHOLESTEROL CALCULATED: ABNORMAL MG/DL
LDL CHOLESTEROL DIRECT: 124 MG/DL
POTASSIUM SERPL-SCNC: 4 MMOL/L (ref 3.5–5.1)
SODIUM BLD-SCNC: 140 MMOL/L (ref 136–145)
TOTAL PROTEIN: 7.3 G/DL (ref 6.4–8.2)
TRIGLYCERIDE, FASTING: 383 MG/DL (ref 0–150)
VLDLC SERPL CALC-MCNC: ABNORMAL MG/DL

## 2022-03-31 PROCEDURE — A4663 DIALYSIS BLOOD PRESSURE CUFF: HCPCS | Performed by: STUDENT IN AN ORGANIZED HEALTH CARE EDUCATION/TRAINING PROGRAM

## 2022-03-31 PROCEDURE — 99214 OFFICE O/P EST MOD 30 MIN: CPT | Performed by: STUDENT IN AN ORGANIZED HEALTH CARE EDUCATION/TRAINING PROGRAM

## 2022-03-31 PROCEDURE — 3046F HEMOGLOBIN A1C LEVEL >9.0%: CPT | Performed by: STUDENT IN AN ORGANIZED HEALTH CARE EDUCATION/TRAINING PROGRAM

## 2022-03-31 RX ORDER — ATENOLOL 25 MG/1
TABLET ORAL
Qty: 180 TABLET | Refills: 1 | Status: SHIPPED | OUTPATIENT
Start: 2022-03-31 | End: 2022-08-09 | Stop reason: SDUPTHER

## 2022-03-31 RX ORDER — GLIMEPIRIDE 2 MG/1
TABLET ORAL
Qty: 90 TABLET | Refills: 1 | Status: SHIPPED | OUTPATIENT
Start: 2022-03-31 | End: 2022-06-06 | Stop reason: ALTCHOICE

## 2022-03-31 ASSESSMENT — ENCOUNTER SYMPTOMS
NAUSEA: 0
WHEEZING: 0
SORE THROAT: 0
SHORTNESS OF BREATH: 0
ABDOMINAL PAIN: 0

## 2022-03-31 ASSESSMENT — PATIENT HEALTH QUESTIONNAIRE - PHQ9
SUM OF ALL RESPONSES TO PHQ QUESTIONS 1-9: 0
SUM OF ALL RESPONSES TO PHQ QUESTIONS 1-9: 0
1. LITTLE INTEREST OR PLEASURE IN DOING THINGS: 0
SUM OF ALL RESPONSES TO PHQ9 QUESTIONS 1 & 2: 0
2. FEELING DOWN, DEPRESSED OR HOPELESS: 0
SUM OF ALL RESPONSES TO PHQ QUESTIONS 1-9: 0
SUM OF ALL RESPONSES TO PHQ QUESTIONS 1-9: 0

## 2022-03-31 NOTE — PROGRESS NOTES
4/12/2022    Verna Fausto Godinez    Chief Complaint   Patient presents with   [address]     previous patient of dr. Yue Mccarty, routine office visit for medicaiton refills    Discuss Labs     patient states he has not been taking glimepiride for a few months, wants to know what he needs to do to bring his A1c down       HPI  History was obtained from patient. Ben Cortés is a 39 y.o. male with a PMHx as listed below who presents today follow up on chronic conditions. No acute complaints. Compliant with medications    Elevation in bp today, patient admits to poor complaincne    2d week 4-5 drink alcohol use. Discussed with patient to decrease use  Labs revewied elevation in LFTs, trending down    Patient admits to poor compliant to glimepiride    Patient has glucose monitor at home he states 150-160 in the mornings it will level out to 90s      1. Essential hypertension    2. Hyperlipidemia, unspecified hyperlipidemia type    3. Type 2 diabetes mellitus without complication, without long-term current use of insulin ([address]    4. LFT elevation    5. Transaminitis             REVIEW OF SYMPTOMS    Review of Systems   Constitutional: Negative for chills and fatigue. HENT: Negative for congestion and sore throat. Respiratory: Negative for shortness of breath and wheezing. Cardiovascular: Negative for chest pain and palpitations. Gastrointestinal: Negative for abdominal pain and nausea. Genitourinary: Negative for frequency and urgency. Neurological: Negative for light-headedness.        PAST MEDICAL HISTORY  Past Medical History:   Diagnosis Date    Diabetes mellitus ([address]     Hyperlipidemia     Hypertension     Obesity        FAMILY HISTORY  Family History   Problem Relation Age of Onset    Diabetes Father     Hypertension Other        SOCIAL HISTORY  Social History     Socioeconomic History    Marital status:      Spouse name: None    Number of children: None    Years of education: None    Highest education level: None   Occupational History    None   Tobacco Use    Smoking status: Never Smoker    Smokeless tobacco: Never Used   Substance and Sexual Activity    Alcohol use: Yes    Drug use: Yes     Types: Marijuana Elfida Schwarz)    Sexual activity: None   Other Topics Concern    None   Social History Narrative    None     Social Determinants of Health     Financial Resource Strain:     Difficulty of Paying Living Expenses: Not on file   Food Insecurity:     Worried About Running Out of Food in the Last Year: Not on file    [place] of Food in the Last Year: Not on file   Transportation Needs:     Lack of Transportation (Medical): Not on file    Lack of Transportation (Non-Medical):  Not on file   Physical Activity:     Days of Exercise per Week: Not on file    Minutes of Exercise per Session: Not on file   Stress:     Feeling of Stress : Not on file   Social Connections:     Frequency of Communication with Friends and Family: Not on file    Frequency of Social Gatherings with Friends and Family: Not on file    Attends Jainism Services: Not on file    Active Member of [address] [place] [address] or Organizations: Not on file    Attends Club or Organization Meetings: Not on file    Marital Status: Not on file   Intimate Partner Violence:     Fear of Current or Ex-Partner: Not on file    Emotionally Abused: Not on file    Physically Abused: Not on file    Sexually Abused: Not on file   Housing Stability:     Unable to Pay for Housing in the Last Year: Not on file    Number of [place] in the Last Year: Not on file    Unstable Housing in the Last Year: Not on file        SURGICAL HISTORY  Past Surgical History:   Procedure Laterality Date    FRACTURE SURGERY      JOINT REPLACEMENT      WISDOM TOOTH EXTRACTION                   CURRENT MEDICATIONS  Current Outpatient Medications   Medication Sig Dispense Refill    metFORMIN (GLUCOPHAGE) 1000 MG tablet TAKE ONE TABLET BY MOUTH TWICE A  tablet 1    glimepiride (AMARYL) 2 MG tablet TAKE ONE TABLET BY MOUTH EVERY MORNING BEFORE BREAKFAST. 90 tablet 1    atenolol (TENORMIN) 25 MG tablet TAKE ONE TABLET BY MOUTH TWICE A  tablet 1    Dulaglutide 0.75 MG/0.5ML SOPN Inject 0.75 mg into the skin once a week 3 pen 2    Blood Glucose Monitoring Suppl (ACCU-CHEK GUIDE) w/Device KIT Use as directed 1 kit 0    blood glucose test strips (ACCU-CHEK GUIDE) strip 1 each by In Vitro route daily As needed. 50 each 5    Accu-Chek Multiclix Lancets MISC Check blood sugar once per day 100 each 5    ACCU-CHEK SOFTCLIX LANCETS MISC 1 each by Does not apply route daily       No current facility-administered medications for this visit. ALLERGIES  Allergies   Allergen Reactions    Orajel Mouth-Aid [Benzocaine] Swelling       PHYSICAL EXAM    BP (!) 164/112   Pulse 90   Ht 6' 0.5\" (1.842 m)   Wt 258 lb (117 kg)   SpO2 95%   BMI 34.51 kg/m²     Physical Exam  Constitutional:       Appearance: Normal appearance. HENT:      Head: Normocephalic and atraumatic. Eyes:      Extraocular Movements: Extraocular movements intact. Pupils: Pupils are equal, round, and reactive to light. Cardiovascular:      Rate and Rhythm: Normal rate and regular rhythm. Pulses: Normal pulses. Heart sounds: No murmur heard. No friction rub. No gallop. Pulmonary:      Effort: Pulmonary effort is normal.      Breath sounds: Normal breath sounds. Skin:     General: Skin is warm and dry. Neurological:      General: No focal deficit present. Mental Status: He is alert. Psychiatric:         Mood and Affect: Mood normal.         Behavior: Behavior normal.         ASSESSMENT & PLAN    1. Essential hypertension  bp elevated discussed to monitor at home, f/u in 4 weeks  - atenolol (TENORMIN) 25 MG tablet; TAKE ONE TABLET BY MOUTH TWICE A DAY  Dispense: 180 tablet; Refill: 1  - [facility] BLOOD PRESSURE CUFF    2.  Hyperlipidemia, unspecified hyperlipidemia type  -elevated, however ascvd lower end, [place] diet/lifestyle, ASCVD 4.5%    The ASCVD Risk score (rEica Kat., et al., 2013) failed to calculate for the following reasons: The 2013 ASCVD risk score is only valid for ages 36 to 78      3. Type 2 diabetes mellitus without complication, without long-term current use of insulin (HCC)  Restart meds  - metFORMIN (GLUCOPHAGE) 1000 MG tablet; TAKE ONE TABLET BY MOUTH TWICE A DAY  Dispense: 180 tablet; Refill: 1  - glimepiride (AMARYL) 2 MG tablet; TAKE ONE TABLET BY MOUTH EVERY MORNING BEFORE BREAKFAST. Dispense: 90 tablet; Refill: 1  - Dulaglutide 0.75 MG/0.5ML SOPN; Inject 0.75 mg into the skin once a week  Dispense: 3 pen; Refill: 2    4. LFT elevation  -trending down obtain u/s GI referral discussed to decrease alcohol use  - US ABDOMINAL LIMITED; Future  - External Referral To Gastroenterology  - Chad Clarke CNP, Gastroenterology, [place]    5. Transaminitis  - Filippo Moeller, Gastroenterology, [facility]      Return in about 4 weeks (around 4/28/2022) for bp, dm2.          Electronically signed by Josselin Diaz DO on 4/12/2022

## 2022-04-04 ENCOUNTER — TELEPHONE (OUTPATIENT)
Dept: GASTROENTEROLOGY | Age: 37
End: 2022-04-04

## 2022-04-04 NOTE — RESULT ENCOUNTER NOTE
Please discuss with patient labs show elevation in cholesterol/triglycerides. (ASCVD 4.5%) Please encourage 1-2 pound weight loss a week. It is important he schedules appointment with GI. LFT still elevated but improved. Limit alcohol use.

## 2022-04-04 NOTE — TELEPHONE ENCOUNTER
Called pt. In regards to a referral for elevated LFTs and transaminitis. Lm for pt to call back and make an appt.

## 2022-04-11 ENCOUNTER — TELEPHONE (OUTPATIENT)
Dept: GASTROENTEROLOGY | Age: 37
End: 2022-04-11

## 2022-04-18 ENCOUNTER — TELEPHONE (OUTPATIENT)
Dept: GASTROENTEROLOGY | Age: 37
End: 2022-04-18

## 2022-04-18 NOTE — TELEPHONE ENCOUNTER
Called pt. For the 3rd time In regards to a referral for elevated LFTs and transaminitis.  Lm for pt to call back and make an appt.

## 2022-04-28 ENCOUNTER — TELEPHONE (OUTPATIENT)
Dept: FAMILY MEDICINE CLINIC | Age: 37
End: 2022-04-28

## 2022-04-28 NOTE — TELEPHONE ENCOUNTER
----- Message from [place] sent at 4/28/2022  3:34 PM EDT -----  Subject: Appointment Request    Reason for Call: Routine Existing Condition Follow Up (Diabetes)    QUESTIONS  Type of Appointment? Established Patient  Reason for appointment request? Available appointments did not meet   patient need  Additional Information for Provider? Pt had to reschedule appointment   5/2/22 due to work. He was to be seen within a month for a follow up.   ---------------------------------------------------------------------------  --------------  [employer]  What is the best way for the office to contact you? OK to leave message on   voicemail  Preferred Call Back Phone Number? [phone]  ---------------------------------------------------------------------------  --------------  SCRIPT ANSWERS  Relationship to Patient? Self  Have your symptoms changed? No  Is this follow up request related to routine Diabetes Management? Yes  Have you been diagnosed with, awaiting test results for, or told that you   are suspected of having COVID-19 (Coronavirus)? (If patient has tested   negative or was tested as a requirement for work, school, or travel and   not based on symptoms, answer no)? No  Within the past 10 days have you developed any of the following symptoms   (answer no if symptoms have been present longer than 10 days or began   more than 10 days ago)? Fever or Chills, Cough, Shortness of breath or   difficulty breathing, Loss of taste or smell, Sore throat, Nasal   congestion, Sneezing or runny nose, Fatigue or generalized body aches   (answer no if pain is specific to a body part e.g. back pain), Diarrhea,   Headache? No  Have you had close contact with someone with COVID-19 in the last 7 days? No  (Service Expert  click yes below to proceed with [facility] As Usual   Scheduling)?  Yes

## 2022-05-02 ENCOUNTER — TELEPHONE (OUTPATIENT)
Dept: FAMILY MEDICINE CLINIC | Age: 37
End: 2022-05-02

## 2022-05-02 NOTE — TELEPHONE ENCOUNTER
Please let pt know that this will need to be an appointment to be able to properly evaluate testing needs.    Breonna Win, BHUPINDER - CNP

## 2022-05-02 NOTE — TELEPHONE ENCOUNTER
Spoke with pt per Mayi note. Pt seemed unsure of the process. Pt will speak further with his wife and return call.

## 2022-05-02 NOTE — TELEPHONE ENCOUNTER
----- Message from Elvin Spears sent at 5/2/2022 10:28 AM EDT -----  Subject: Message to Provider    QUESTIONS  Information for Provider? Patient is calling because he would like to know   if he could get a order for a sperm count test. Please advise  ---------------------------------------------------------------------------  --------------  CALL BACK INFO  What is the best way for the office to contact you? OK to leave message on   voicemail  Preferred Call Back Phone Number? [phone]  ---------------------------------------------------------------------------  --------------  SCRIPT ANSWERS  Relationship to Patient?  Self

## 2022-06-02 ENCOUNTER — HOSPITAL ENCOUNTER (OUTPATIENT)
Dept: ULTRASOUND IMAGING | Age: 37
Discharge: HOME | End: 2022-06-02
Payer: COMMERCIAL

## 2022-06-02 DIAGNOSIS — R79.89 LFT ELEVATION: ICD-10-CM

## 2022-06-02 PROCEDURE — 76705 ECHO EXAM OF ABDOMEN: CPT

## 2022-06-03 DIAGNOSIS — R16.0 ENLARGEMENT OF LIVER: ICD-10-CM

## 2022-06-03 DIAGNOSIS — R74.01 TRANSAMINITIS: Primary | ICD-10-CM

## 2022-06-06 ENCOUNTER — OFFICE VISIT (OUTPATIENT)
Dept: GASTROENTEROLOGY | Age: 37
End: 2022-06-06
Payer: COMMERCIAL

## 2022-06-06 VITALS
SYSTOLIC BLOOD PRESSURE: 142 MMHG | HEIGHT: 72 IN | HEART RATE: 64 BPM | DIASTOLIC BLOOD PRESSURE: 90 MMHG | BODY MASS INDEX: 35 KG/M2 | TEMPERATURE: 97.2 F | WEIGHT: 258.4 LBS | OXYGEN SATURATION: 98 %

## 2022-06-06 DIAGNOSIS — K76.0 FATTY LIVER: ICD-10-CM

## 2022-06-06 DIAGNOSIS — R74.8 ELEVATED LIVER ENZYMES: Primary | ICD-10-CM

## 2022-06-06 PROCEDURE — 99203 OFFICE O/P NEW LOW 30 MIN: CPT | Performed by: NURSE PRACTITIONER

## 2022-06-06 ASSESSMENT — ENCOUNTER SYMPTOMS
BLOOD IN STOOL: 0
SHORTNESS OF BREATH: 0
NAUSEA: 0
BACK PAIN: 0
VOMITING: 0
EYE PAIN: 0
ABDOMINAL PAIN: 0
COLOR CHANGE: 0
WHEEZING: 0
COUGH: 0
DIARRHEA: 0
CONSTIPATION: 0
EYE DISCHARGE: 0

## 2022-06-06 NOTE — PROGRESS NOTES
Sandra Ta 39 y.o. male was seen by WILLIAMS Soliz on 6/6/2022     Wt Readings from Last 3 Encounters:   06/06/22 258 lb 6.4 oz (117.2 kg)   03/31/22 258 lb (117 kg)   01/22/21 278 lb 12.8 oz (126.5 kg)       HPI  Sandra Ta is a pleasant 39 y.o.  male who presents today for elevated liver enzymes and fatty liver. He has a past medical history of diabetes mellitus, hyperlipidemia, hypertension, and obesity. His abdominal ultrasound done on 6-2-2022 showed hepatomegaly with diffuse fatty infiltration of the liver otherwise normal results. He has no known liver disease or family history of liver disease. Rare NSAID use. He drinks six to seven beers three nights a week. His lab work done on 3- showed Glucose 292, Total Bilirubin 0.3, Alkaline Phosphatase 65, , and AST 79. His appetite is good without early satiety. His weight is stable. He is working on weight loss with diet changes and portion control. No nausea of vomiting. He has Intermittent heartburn. No nocturnal awakenings with acid reflux. No dysphagia or pain with swallowing. No abdominal pain, bloating or distention. No excess belching or flatulence. He denies changes in his bowel pattern. His typical bowel pattern is two to three times daily with soft brown formed stools. No diarrhea or constipation. No blood in his stool or melena. No family history of stomach or colon cancer. ROS  Review of Systems   Constitutional: Negative for appetite change, chills, diaphoresis, fatigue, fever and unexpected weight change. HENT: Negative for ear pain and hearing loss. Eyes: Negative for pain, discharge and visual disturbance. Respiratory: Negative for cough, shortness of breath and wheezing. Cardiovascular: Negative for chest pain, palpitations and leg swelling. Gastrointestinal: Negative for abdominal pain, blood in stool, constipation, diarrhea, nausea and vomiting.    Endocrine: Negative for cold intolerance and heat intolerance. Genitourinary: Negative for dysuria, frequency, hematuria and urgency. Musculoskeletal: Negative for back pain, myalgias and neck pain. Skin: Negative for color change, pallor and rash. Allergic/Immunologic: Negative for environmental allergies and food allergies. Neurological: Negative for dizziness, seizures, weakness and headaches. Hematological: Does not bruise/bleed easily. Psychiatric/Behavioral: Negative for dysphoric mood and sleep disturbance. The patient is not nervous/anxious. Allergies  Allergies   Allergen Reactions    Orajel Mouth-Aid [Benzocaine] Swelling       Medications  Current Outpatient Medications   Medication Sig Dispense Refill    metFORMIN (GLUCOPHAGE) 1000 MG tablet TAKE ONE TABLET BY MOUTH TWICE A  tablet 1    atenolol (TENORMIN) 25 MG tablet TAKE ONE TABLET BY MOUTH TWICE A  tablet 1    Dulaglutide 0.75 MG/0.5ML SOPN Inject 0.75 mg into the skin once a week 3 pen 2    Blood Glucose Monitoring Suppl (ACCU-CHEK GUIDE) w/Device KIT Use as directed 1 kit 0    blood glucose test strips (ACCU-CHEK GUIDE) strip 1 each by In Vitro route daily As needed. 50 each 5    Accu-Chek Multiclix Lancets MISC Check blood sugar once per day 100 each 5    ACCU-CHEK SOFTCLIX LANCETS MISC 1 each by Does not apply route daily       No current facility-administered medications for this visit. Past medical history:   He has a past medical history of Diabetes mellitus ([address] Hyperlipidemia, Hypertension, and Obesity. Past surgical history:  He has a past surgical history that includes joint replacement; fracture surgery; and [place] tooth extraction. Social History:  He reports that he has never smoked. He has never used smokeless tobacco. He reports current alcohol use. He reports current drug use. Drug: Marijuana Lana November).     Family history:  His family history includes Diabetes in his father; Hypertension in an other family member. Objective    Vitals:    06/06/22 1501   BP: (!) 142/90   Pulse:    Temp:    SpO2:         Physical exam    Physical Exam  Constitutional:       General: He is not in acute distress. Appearance: He is well-developed. He is obese. He is not ill-appearing, toxic-appearing or diaphoretic. HENT:      Head: Normocephalic and atraumatic. Nose: Nose normal.      Mouth/Throat:      Mouth: Mucous membranes are moist.   Cardiovascular:      Rate and Rhythm: Normal rate and regular rhythm. Pulses: Normal pulses. Heart sounds: Normal heart sounds. No murmur heard. No gallop. Pulmonary:      Effort: Pulmonary effort is normal. No respiratory distress. Breath sounds: Normal breath sounds. No stridor. No wheezing or rhonchi. Abdominal:      General: Bowel sounds are normal. There is no distension. Palpations: Abdomen is soft. There is no mass. Tenderness: There is no abdominal tenderness. Hernia: No hernia is present. Musculoskeletal:         General: Normal range of motion. Cervical back: Neck supple. Skin:     General: Skin is warm and dry. Neurological:      Mental Status: He is alert and oriented to person, place, and time. Psychiatric:         Mood and Affect: Mood normal.         No visits with results within 2 Month(s) from this visit. Latest known visit with results is:   Abstract on 03/31/2022   Component Date Value Ref Range Status    Hemoglobin A1C 03/31/2022 10.3  % Final       Assessment and Plan:  1. Elevated liver enzymes most likely due to fatty liver and/or alcohol use. Will order chronic liver lab work up to rule out autoimmune hepatitis, Hepatitis A, B and C, etc.  The patient was encouraged to avoid alcohol and recommend weight loss with low fat diet and daily exercise. 2.  Fatty liver noted on abdominal ultrasound due to obesity from excess calories/metabolic syndrome.   His Fib 4 index is 1.27 which is negative predictive value for advanced fibrosis. The patient was encouraged to lose at least 10-15% of his body weight. The patient was provided with information on fatty liver and liver disease diet. 3.  The patient was encouraged to follow-up in 2 months. Total time:  30 minutes.

## 2022-06-06 NOTE — PATIENT INSTRUCTIONS
Patient Education        Liver Disease Diet: Care Instructions  Overview     The liver does many jobs that are vital to the rest of your body. Whensomething is wrong with the liver, your body may not get the nutrition it needs. It is important that you eat a healthy diet that includes a variety of foods from the basic food groups: grains, vegetables, fruits, dairy, and protein foods. Follow your doctor's instructions for eating carbohydrate, protein, and fat in the right amounts for you. Your doctor also may limit salt or take salt out of your diet to help protect the liver. Always talk with your doctor ordietitian before you make changes in your diet. Follow-up care is a key part of your treatment and safety. Be sure to make and go to all appointments, and call your doctor if you are having problems. It's also a good idea to know your test results and keep alist of the medicines you take. How can you care for yourself at home?  Work with your doctor or dietitian to create a food plan that guides your daily food choices.  Do not skip meals or go for many hours without eating. If you eat several small meals during the day, you have a better chance of getting the extra calories your body needs for energy.  Follow your doctor's or dietitian's instructions on how to get the right amount of protein in your diet. Examples of animal protein are:  ? Meat, fish, and poultry. ? Eggs. ? Milk and milk products.  Your doctor or dietitian may ask you to eat a certain amount of protein that comes from plants (rather than protein that comes from animals). You can get plant protein from foods such as:  ? Cooked dried beans and peas. ? Peanut butter, nuts, and seeds. ? Tofu.  Limit salt, if your doctor tells you to. This will help prevent fluid buildup in your belly and chest, which can cause serious problems. Salt is in many prepared foods, such as ferguson, canned foods, snack foods, sauces, and soups.  Look for reduced-salt products.  Your doctor may recommend vitamin and mineral supplements. However, do not take any other medicine, including over-the-counter medicines, vitamins, and herbal products, without talking to your doctor first.  Gisell Matthew Do not drink any alcohol. It can harm your liver. Talk to your doctor if you need help to stop drinking.  If you have a loss of appetite or have nausea or vomiting, try to:  ? Stay away from foods and food smells that make you feel worse. ? Avoid greasy or fatty foods. ? Eat food that settles your stomach when it feels upset. Try crackers, dry toast, or ivan (ivan tea, hard ivan candy, or crystallized ivan). When should you call for help? Watch closely for changes in your health, and be sure to contact your doctor if you have any problems. Where can you learn more? Go to https://[email or web address]. org and sign in to your [employer] account. Enter Q608 in the [employer] box to learn more about \"Liver Disease Diet: Care Instructions. \"     If you do not have an account, please click on the \"Sign Up Now\" link. Current as of: September 8, 2021               Content Version: 13.2  © 2006-2022 Healthwise, Incorporated. Care instructions adapted under license by [facility] [address] If you have questions about a medical condition or this instruction, always ask your healthcare professional. [place] [address] any warranty or liability for your use of this information.

## 2022-06-08 ENCOUNTER — TELEPHONE (OUTPATIENT)
Dept: FAMILY MEDICINE CLINIC | Age: 37
End: 2022-06-08

## 2022-06-08 DIAGNOSIS — R74.8 ELEVATED LIVER ENZYMES: ICD-10-CM

## 2022-06-08 DIAGNOSIS — K76.0 FATTY LIVER: ICD-10-CM

## 2022-06-08 DIAGNOSIS — E11.9 TYPE 2 DIABETES MELLITUS WITHOUT COMPLICATION, WITHOUT LONG-TERM CURRENT USE OF INSULIN (HCC): Primary | ICD-10-CM

## 2022-06-08 LAB
ALBUMIN SERPL-MCNC: 4.9 G/DL (ref 3.4–5)
ALP BLD-CCNC: 56 U/L (ref 40–129)
ALT SERPL-CCNC: 130 U/L (ref 10–40)
AST SERPL-CCNC: 82 U/L (ref 15–37)
BASOPHILS ABSOLUTE: 0 K/UL (ref 0–0.2)
BASOPHILS RELATIVE PERCENT: 0.6 %
BILIRUB SERPL-MCNC: 0.5 MG/DL (ref 0–1)
BILIRUBIN DIRECT: <0.2 MG/DL (ref 0–0.3)
BILIRUBIN, INDIRECT: ABNORMAL MG/DL (ref 0–1)
EOSINOPHILS ABSOLUTE: 0.1 K/UL (ref 0–0.6)
EOSINOPHILS RELATIVE PERCENT: 2.1 %
FERRITIN: 139 NG/ML (ref 30–400)
GAMMA GLUTAMYL TRANSFERASE: 75 U/L (ref 8–61)
HAV IGM SER IA-ACNC: NORMAL
HBV SURFACE AB TITR SER: 31.32 MIU/ML
HCT VFR BLD CALC: 45.2 % (ref 40.5–52.5)
HEMOGLOBIN: 15.4 G/DL (ref 13.5–17.5)
HEPATITIS B CORE IGM ANTIBODY: NORMAL
HEPATITIS B SURFACE ANTIGEN INTERPRETATION: NORMAL
HEPATITIS C ANTIBODY INTERPRETATION: NORMAL
INR BLD: 0.99 (ref 0.87–1.14)
IRON SATURATION: 25 % (ref 20–50)
IRON: 102 UG/DL (ref 59–158)
LYMPHOCYTES ABSOLUTE: 1.9 K/UL (ref 1–5.1)
LYMPHOCYTES RELATIVE PERCENT: 30.2 %
MCH RBC QN AUTO: 28.9 PG (ref 26–34)
MCHC RBC AUTO-ENTMCNC: 34.1 G/DL (ref 31–36)
MCV RBC AUTO: 84.5 FL (ref 80–100)
MONOCYTES ABSOLUTE: 0.5 K/UL (ref 0–1.3)
MONOCYTES RELATIVE PERCENT: 8.1 %
NEUTROPHILS ABSOLUTE: 3.7 K/UL (ref 1.7–7.7)
NEUTROPHILS RELATIVE PERCENT: 59 %
PDW BLD-RTO: 14.7 % (ref 12.4–15.4)
PLATELET # BLD: 167 K/UL (ref 135–450)
PMV BLD AUTO: 9 FL (ref 5–10.5)
PROTHROMBIN TIME: 13 SEC (ref 11.7–14.5)
RBC # BLD: 5.35 M/UL (ref 4.2–5.9)
TOTAL IRON BINDING CAPACITY: 416 UG/DL (ref 260–445)
TOTAL PROTEIN: 7.7 G/DL (ref 6.4–8.2)
WBC # BLD: 6.2 K/UL (ref 4–11)

## 2022-06-08 NOTE — TELEPHONE ENCOUNTER
Patient came in for labs today for another [facility] provider. He did not understand why our office did not order A1c. I let him know they are due every 3 months, he is not due. I tried to explain to him we can still treat patient for DM even if we are not checking A1c again. He does not feel he should come back for a visit as scheduled before another A1c is due. Please advise.

## 2022-06-09 LAB
ANTI-NUCLEAR ANTIBODY (ANA): NEGATIVE
HAV AB SERPL IA-ACNC: POSITIVE
HEPATITIS B CORE TOTAL ANTIBODY: NEGATIVE

## 2022-06-10 LAB — CERULOPLASMIN: 22 MG/DL (ref 15–30)

## 2022-06-11 LAB
F-ACTIN AB IGG: 6 UNITS (ref 0–19)
HERPES TYPE I/II IGG COMBINED: 0.39 IV

## 2022-06-12 LAB — MITOCHONDRIAL M2 AB, IGG: 0.7 U/ML (ref 0–4)

## 2022-06-21 LAB
ALPHA-1 ANTITRYPSIN PHENOTYPE: NORMAL
ALPHA-1 ANTITRYPSIN: 123 MG/DL (ref 90–200)

## 2022-06-27 DIAGNOSIS — E11.9 TYPE 2 DIABETES MELLITUS WITHOUT COMPLICATION, WITHOUT LONG-TERM CURRENT USE OF INSULIN (HCC): ICD-10-CM

## 2022-06-27 RX ORDER — DULAGLUTIDE 0.75 MG/.5ML
INJECTION, SOLUTION SUBCUTANEOUS
Qty: 4 PEN | Refills: 2 | Status: SHIPPED | OUTPATIENT
Start: 2022-06-27 | End: 2022-08-09

## 2022-08-09 ENCOUNTER — OFFICE VISIT (OUTPATIENT)
Dept: FAMILY MEDICINE CLINIC | Age: 37
End: 2022-08-09
Payer: COMMERCIAL

## 2022-08-09 VITALS
BODY MASS INDEX: 35.21 KG/M2 | DIASTOLIC BLOOD PRESSURE: 70 MMHG | OXYGEN SATURATION: 97 % | HEIGHT: 72 IN | HEART RATE: 63 BPM | RESPIRATION RATE: 16 BRPM | SYSTOLIC BLOOD PRESSURE: 119 MMHG | WEIGHT: 260 LBS

## 2022-08-09 DIAGNOSIS — I10 ESSENTIAL HYPERTENSION: ICD-10-CM

## 2022-08-09 DIAGNOSIS — E11.9 TYPE 2 DIABETES MELLITUS WITHOUT COMPLICATION, WITHOUT LONG-TERM CURRENT USE OF INSULIN (HCC): ICD-10-CM

## 2022-08-09 PROCEDURE — 3046F HEMOGLOBIN A1C LEVEL >9.0%: CPT | Performed by: STUDENT IN AN ORGANIZED HEALTH CARE EDUCATION/TRAINING PROGRAM

## 2022-08-09 PROCEDURE — 99214 OFFICE O/P EST MOD 30 MIN: CPT | Performed by: STUDENT IN AN ORGANIZED HEALTH CARE EDUCATION/TRAINING PROGRAM

## 2022-08-09 RX ORDER — SEMAGLUTIDE 1.34 MG/ML
1 INJECTION, SOLUTION SUBCUTANEOUS
Qty: 12 PEN | Refills: 1 | Status: SHIPPED
Start: 2022-08-09 | End: 2022-08-15 | Stop reason: CLARIF

## 2022-08-09 RX ORDER — ATENOLOL 25 MG/1
TABLET ORAL
Qty: 180 TABLET | Refills: 1 | Status: SHIPPED | OUTPATIENT
Start: 2022-08-09

## 2022-08-09 ASSESSMENT — ENCOUNTER SYMPTOMS
ABDOMINAL PAIN: 0
SHORTNESS OF BREATH: 0
NAUSEA: 0
WHEEZING: 0
SORE THROAT: 0

## 2022-08-09 NOTE — PROGRESS NOTES
8/9/2022    [address] [place] [address]    Chief Complaint   Patient presents with    Follow-up     Presenting for 4 month follow up visit. Thinks may need to increase dosage of trulicity. HPI  History was obtained from patient. Radha Guajardo is a 40 y.o. male with a PMHx as listed below who presents today for     Bp stable    Glucose [id number] tolerating trulicity  1. Essential hypertension    2. Type 2 diabetes mellitus without complication, without long-term current use of insulin ([facility])             REVIEW OF SYMPTOMS    Review of Systems   Constitutional:  Negative for chills and fatigue. HENT:  Negative for congestion and sore throat. Respiratory:  Negative for shortness of breath and wheezing. Cardiovascular:  Negative for chest pain and palpitations. Gastrointestinal:  Negative for abdominal pain and nausea. Genitourinary:  Negative for frequency and urgency. Neurological:  Negative for light-headedness.      PAST MEDICAL HISTORY  Past Medical History:   Diagnosis Date    Diabetes mellitus ([address]     Hyperlipidemia     Hypertension     Obesity        FAMILY HISTORY  Family History   Problem Relation Age of Onset    Diabetes Father     Hypertension Other        SOCIAL HISTORY  Social History     Socioeconomic History    Marital status:      Spouse name: None    Number of children: None    Years of education: None    Highest education level: None   Tobacco Use    Smoking status: Never    Smokeless tobacco: Never   Substance and Sexual Activity    Alcohol use: Yes    Drug use: Yes     Types: Marijuana Dior Postin)        SURGICAL HISTORY  Past Surgical History:   Procedure Laterality Date    FRACTURE SURGERY      JOINT REPLACEMENT      WISDOM TOOTH EXTRACTION                   CURRENT MEDICATIONS  Current Outpatient Medications   Medication Sig Dispense Refill    atenolol (TENORMIN) 25 MG tablet TAKE ONE TABLET BY MOUTH TWICE A  tablet 1    metFORMIN (GLUCOPHAGE) 1000 MG tablet TAKE ONE TABLET BY MOUTH TWICE A  tablet 1    Semaglutide,0.25 or 0.5MG/DOS, (OZEMPIC, 0.25 OR 0.5 MG/DOSE,) 2 MG/1.5ML SOPN Inject 1 mg into the skin every 7 days 12 pen 1    blood glucose test strips (ACCU-CHEK GUIDE) strip 1 each by In Vitro route daily As needed. 50 each 5    Accu-Chek Multiclix Lancets MISC Check blood sugar once per day 100 each 5    ACCU-CHEK SOFTCLIX LANCETS MISC 1 each by Does not apply route daily       No current facility-administered medications for this visit. ALLERGIES  Allergies   Allergen Reactions    Orajel Mouth-Aid [Benzocaine] Swelling       PHYSICAL EXAM    /70   Pulse 63   Resp 16   Ht 6' (1.829 m)   Wt 260 lb (117.9 kg)   SpO2 97%   BMI 35.26 kg/m²     Physical Exam  Constitutional:       Appearance: Normal appearance. HENT:      Head: Normocephalic and atraumatic. Eyes:      Extraocular Movements: Extraocular movements intact. Pupils: Pupils are equal, round, and reactive to light. Cardiovascular:      Rate and Rhythm: Normal rate and regular rhythm. Pulses: Normal pulses. Heart sounds: No murmur heard. No friction rub. No gallop. Pulmonary:      Effort: Pulmonary effort is normal.      Breath sounds: Normal breath sounds. Musculoskeletal:      Cervical back: Neck supple. Skin:     General: Skin is warm and dry. Neurological:      General: No focal deficit present. Mental Status: He is alert. Psychiatric:         Mood and Affect: Mood normal.         Behavior: Behavior normal.       ASSESSMENT & PLAN    1. Essential hypertension  Stable on current regimen  - atenolol (TENORMIN) 25 MG tablet; TAKE ONE TABLET BY MOUTH TWICE A DAY  Dispense: 180 tablet; Refill: 1    2. Type 2 diabetes mellitus without complication, without long-term current use of insulin (HCC)  - metFORMIN (GLUCOPHAGE) 1000 MG tablet; TAKE ONE TABLET BY MOUTH TWICE A DAY  Dispense: 180 tablet;  Refill: 1  - Semaglutide,0.25 or 0.5MG/DOS, (OZEMPIC, 0.25 OR 0.5 MG/DOSE,) 2 MG/1.5ML SOPN; Inject 1 mg into the skin every 7 days  Dispense: 12 pen; Refill: 1    Bp much improved  Switch to ozempic 1mg for better coverage, tolerating trulicity well. Lab work prior to next appt. Heatitis panel reviewed WNL    Return in about 6 months (around 1/27/2023) for needs to be seen before Jan 31.          Electronically signed by Dennise Mahoney DO on 8/9/2022

## 2022-08-10 ENCOUNTER — TELEPHONE (OUTPATIENT)
Dept: FAMILY MEDICINE CLINIC | Age: 37
End: 2022-08-10

## 2022-08-11 ENCOUNTER — OFFICE VISIT (OUTPATIENT)
Dept: GASTROENTEROLOGY | Age: 37
End: 2022-08-11
Payer: COMMERCIAL

## 2022-08-11 VITALS
OXYGEN SATURATION: 96 % | HEIGHT: 72 IN | HEART RATE: 95 BPM | WEIGHT: 264.4 LBS | SYSTOLIC BLOOD PRESSURE: 140 MMHG | DIASTOLIC BLOOD PRESSURE: 80 MMHG | BODY MASS INDEX: 35.81 KG/M2 | TEMPERATURE: 97.3 F

## 2022-08-11 DIAGNOSIS — K76.0 FATTY LIVER: Primary | ICD-10-CM

## 2022-08-11 DIAGNOSIS — R74.8 ELEVATED LIVER ENZYMES: ICD-10-CM

## 2022-08-11 PROCEDURE — 99214 OFFICE O/P EST MOD 30 MIN: CPT | Performed by: NURSE PRACTITIONER

## 2022-08-11 NOTE — PROGRESS NOTES
Isma Contreras 40 y.o. male was seen by WILLIAMS Quintana on 08/11/22     Wt Readings from Last 3 Encounters:   08/11/22 264 lb 6.4 oz (119.9 kg)   08/09/22 260 lb (117.9 kg)   06/06/22 258 lb 6.4 oz (117.2 kg)       YAKOV Contreras is a pleasant 40 y.o.  male who presents today for follow-up on elevated liver enzymes and fatty liver. His abdominal ultrasound done on 6-2-2022 showed hepatomegaly with diffuse fatty infiltration of the liver otherwise normal results. His lab work up done on 6-8-2022 showed A-1 antitrypsin 123, MELISA negative, GGT 75, HSV 0.39, Mitochondrial antibodies 0.7, F-Actin 6, Ceruloplasmin 22, Ferritin 139, Iron 102, TIBC 416, Iron sat 25%. Alkaline phosphatase 56, AST 82,  and Total Bilirubin 0.5. He drinks two beers once or twice a week. His appetite is good and weight is stable. He mentioned not working on weight loss in last month but plans on returning to low fat diet soon. No abdominal pain, bloating or distention. No nausea or vomiting. He has Intermittent heartburn. No nocturnal awakenings with acid reflux. No dysphagia or pain with swallowing. No excess belching or flatulence. He denies changes in his bowel pattern. His typical bowel pattern is two to three times daily with soft brown formed stools. No diarrhea or constipation. No blood in his stool or melena. No family history of stomach or colon cancer. ROS  Review of Systems  Constitutional: Negative for appetite change, chills, diaphoresis, fatigue, fever and unexpected weight change. HENT: Negative for ear pain and hearing loss. Eyes: Negative for pain, discharge and visual disturbance. Respiratory: Negative for cough, shortness of breath and wheezing. Cardiovascular: Negative for chest pain, palpitations and leg swelling. Gastrointestinal: Negative for abdominal pain, blood in stool, constipation, diarrhea, nausea and vomiting.   Endocrine: Negative for cold intolerance and heat intolerance. Genitourinary: Negative for dysuria, frequency, hematuria and urgency. Musculoskeletal: Negative for back pain, myalgias and neck pain. Skin: Negative for color change, pallor and rash. Allergic/Immunologic: Negative for environmental allergies and food allergies. Neurological: Negative for dizziness, seizures, weakness and headaches. Hematological: Does not bruise/bleed easily. Psychiatric/Behavioral: Negative for dysphoric mood and sleep disturbance. The patient is not nervous/anxious. Allergies  Allergies   Allergen Reactions    Orajel Mouth-Aid [Benzocaine] Swelling       Medications  Current Outpatient Medications   Medication Sig Dispense Refill    atenolol (TENORMIN) 25 MG tablet TAKE ONE TABLET BY MOUTH TWICE A  tablet 1    metFORMIN (GLUCOPHAGE) 1000 MG tablet TAKE ONE TABLET BY MOUTH TWICE A  tablet 1    blood glucose test strips (ACCU-CHEK GUIDE) strip 1 each by In Vitro route daily As needed. 50 each 5    Accu-Chek Multiclix Lancets MISC Check blood sugar once per day 100 each 5    ACCU-CHEK SOFTCLIX LANCETS MISC 1 each by Does not apply route daily      Semaglutide,0.25 or 0.5MG/DOS, (OZEMPIC, 0.25 OR 0.5 MG/DOSE,) 2 MG/1.5ML SOPN Inject 1 mg into the skin every 7 days (Patient not taking: Reported on 8/11/2022) 12 pen 1     No current facility-administered medications for this visit. Past medical history:   He has a past medical history of Diabetes mellitus ([address] Hyperlipidemia, Hypertension, and Obesity. Past surgical history:  He has a past surgical history that includes joint replacement; fracture surgery; and [place] tooth extraction. Social History:  He reports that he has never smoked. He has never used smokeless tobacco. He reports current alcohol use. He reports current drug use. Drug: Marijuana Aj Wilson).     Family history:  His family history includes Diabetes in his father; Hypertension in an other family member. Objective    Vitals:    08/11/22 0906   BP: (!) 164/92   Pulse: 95   Temp: 97.3 °F (36.3 °C)   SpO2: 96%        Physical exam    Physical Exam  Vitals reviewed. Constitutional:       General: He is not in acute distress. Appearance: Normal appearance. He is well-developed. He is obese. He is not ill-appearing or diaphoretic. HENT:      Head: Normocephalic and atraumatic. Nose: Nose normal.      Mouth/Throat:      Mouth: Mucous membranes are moist.   Eyes:      Conjunctiva/sclera: Conjunctivae normal.      Pupils: Pupils are equal, round, and reactive to light. Neck:      Thyroid: No thyromegaly. Vascular: No JVD. Trachea: No tracheal deviation. Cardiovascular:      Rate and Rhythm: Normal rate and regular rhythm. Pulses: Normal pulses. Heart sounds: Normal heart sounds. No murmur heard. No friction rub. No gallop. Pulmonary:      Effort: Pulmonary effort is normal. No respiratory distress. Breath sounds: Normal breath sounds. No stridor. No wheezing, rhonchi or rales. Chest:      Chest wall: No tenderness. Abdominal:      General: Bowel sounds are normal. There is no distension. Palpations: Abdomen is soft. There is no mass. Tenderness: There is no abdominal tenderness. There is no guarding or rebound. Hernia: No hernia is present. Musculoskeletal:         General: Normal range of motion. Cervical back: Normal range of motion and neck supple. Lymphadenopathy:      Cervical: No cervical adenopathy. Skin:     General: Skin is warm and dry. Neurological:      Mental Status: He is alert and oriented to person, place, and time. Psychiatric:         Mood and Affect: Mood normal.       No visits with results within 2 Month(s) from this visit.    Latest known visit with results is:   Orders Only on 06/08/2022   Component Date Value Ref Range Status    Ferritin 06/08/2022 139.0  30.0 - 400.0 ng/mL Final    Herpes Type I/II IgG Combined 06/08/2022 0.39  IV Final    Comment: INTERPRETIVE INFORMATION: HSV 1/2 COMBINED Ab SCREEN, IgG    0.89 IV or less. ....... Gage Holly Not Detected    0.90-1.09 IV. .......... Gage Holly Indeterminate- Repeat testing                            in 10-14 days may be helpful. 1.10 IV or greater. ... Gage Avni Holly Detected  The best evidence for current infection is a significant change on two  appropriately timed specimens, where both tests are done in the same  laboratory at the same time. Performed By: Jluis Cruz [address] [place] [address], [address] [place] [address]  : Thony Vergara. Bala Elizondo MD      Hep A IgM 06/08/2022 Non-reactive  Non-reactive Final    Hep B Core Ab, IgM 06/08/2022 Non-reactive  Non-reactive Final    Hep B S Ag Interp 06/08/2022 Non-reactive  Non-reactive Final    Hep C Ab Interp 06/08/2022 Non-reactive  Non-reactive Final    Hep B S Ab 06/08/2022 31.32  mIU/mL Final    Comment: <8.5 = Negative  >=8.5 and <11.5 = Indeterminate  >=11.5 = Positive (Immune)      Hep B Core Total Ab 06/08/2022 Negative  Negative Final    Comment: INTERPRETIVE INFORMATION: Hepatitis B Core Ab (Total)  This assay should not be used for blood donor screening, associated  re-entry  protocols, or for screening Human Cells, Tissues and Cellular and  Tissue-Based Products (HCT/P). Performed by Anthony Tripp , [address] [phone]  www. Steph Elizondo MD - Lab. Director      Hep A Total Ab 06/08/2022 Positive (A) Negative Final    Comment: The positive anti-HAV is consistent with recent or remote Hepatitis A  infection or antibody response to HAV vaccination. False positive  anti-HAV  can occur. Performed by Anthony Tripp , [address] [phone]  www. Steph Elizondo MD - Lab.  Director      Total Protein 06/08/2022 7.7  6.4 - 8.2 g/dL Final    Albumin 06/08/2022 4.9  3.4 - 5.0 g/dL Final    Alkaline Phosphatase 06/08/2022 56  40 - 129 U/L Final    ALT 06/08/2022 130 (A) 10 - 40 U/L Final    AST 06/08/2022 82 (A) 15 - 37 U/L Final    Total Bilirubin 06/08/2022 0.5  0.0 - 1.0 mg/dL Final    Bilirubin, Direct 06/08/2022 <0.2  0.0 - 0.3 mg/dL Final    Bilirubin, Indirect 06/08/2022 see below  0.0 - 1.0 mg/dL Final    Comment: Indirect Bilirubin cannot be calculated since Total Bilirubin  and/or Direct Bilirubin is below measurable range. GGT 06/08/2022 75 (A) 8 - 61 U/L Final    MELISA 06/08/2022 Negative  Negative Final    Comment: If POSITIVE, specimen will be sent to reference laboratory  for further testing. MELISA specimens are screened using multiplex bead immunoassay  methodology. All results reported as positive are further tested  by indirect fluorescent assay (IFA) using Hep-2 substrate with  an IgG-specific conjugate. The MELISA screen is designed to detect  antibodies against dsDNA, SS-A (Ro), SS-B (La), Mckenzie (Sm), SmRNP,  RNP, Scl-70, Stephany-1, Centromere, Chromatin, and Ribosomal P. A-1 Antitrypsin 06/08/2022 123  90 - 200 mg/dL Final    To convert to umol/L, multiply mg/dL by 0.185    A-1 Antitrypsin Pheno 06/08/2022 M2M2   Final    Comment:  The patient appears to have a normal phenotype. All M alleles  (including  subtypes M1, M2, and M3) produce normal serum concentrations of  alpha-1-protease inhibitor and are not associated with clinical  disease. Caution in interpretation is advised if the patient has been transfused  within the previous 21 days. Performed By: Mina Cruz [address]  [place], [address] [facility]  : Carole Solomon.  Julian Tyler MD      WBC 06/08/2022 6.2  4.0 - 11.0 K/uL Final    RBC 06/08/2022 5.35  4.20 - 5.90 M/uL Final    Hemoglobin 06/08/2022 15.4  13.5 - 17.5 g/dL Final    Hematocrit 06/08/2022 45.2  40.5 - 52.5 % Final    MCV 06/08/2022 84.5  80.0 - 100.0 fL Final    MCH 06/08/2022 28.9  26.0 - 34.0 pg Final    MCHC 06/08/2022 34.1  31.0 - 36.0 g/dL Final    RDW 06/08/2022 14.7  12.4 - 15.4 % Final    Platelets 42/55/9701 167  135 - 450 K/uL Final    MPV 06/08/2022 9.0  5.0 - 10.5 fL Final    Neutrophils % 06/08/2022 59.0  % Final    Lymphocytes % 06/08/2022 30.2  % Final    Monocytes % 06/08/2022 8.1  % Final    Eosinophils % 06/08/2022 2.1  % Final    Basophils % 06/08/2022 0.6  % Final    Neutrophils Absolute 06/08/2022 3.7  1.7 - 7.7 K/uL Final    Lymphocytes Absolute 06/08/2022 1.9  1.0 - 5.1 K/uL Final    Monocytes Absolute 06/08/2022 0.5  0.0 - 1.3 K/uL Final    Eosinophils Absolute 06/08/2022 0.1  0.0 - 0.6 K/uL Final    Basophils Absolute 06/08/2022 0.0  0.0 - 0.2 K/uL Final    Ceruloplasmin 06/08/2022 22  15 - 30 mg/dL Final    Nico Schwab [address] [address] [phone]    F-ACTIN AB IGG 06/08/2022 6  0 - 19 Units Final    Comment:  If F-Actin (Smooth Muscle) Antibody, IgG is negative, the Smooth  Muscle  Antibody titer by IFA is not performed. REFERENCE INTERVAL: F-Actin (Smooth Muscle) Antibody, IgG by                      KELTON    19 Units or less . ...... Negative    20 - 30 Units . ......... Weak Positive-Suggest repeat                             testing in two to three weeks                             with fresh specimen. 31 Units or greater. .... Positive-Suggestive of                             autoimmune hepatitis type 1                             or chronic active hepatitis. F-actin IgG antibodies have been shown to have increased sensitivity  for  autoimmune hepatitis (AIH) but lower specificity than smooth muscle  antibodies (SMA). F-actin IgG antibodies can also be seen in  SMA-negative  disease controls (non-AIH), especially in patients with primary biliary  cirrhosis and chronic hepatitis C infections. Some patients with AIH  may be  SMA-positive but negative for F-actin IgG. Consider testing                            for SMA by  IFA if  suspicion for AIH is strong. Performed By: Piter Cruz [address]  [place], [address] [facility]  : Jessica Hartley.  Artis Primrose, MD      Mitochondrial M2 Ab, Igg 06/08/2022 0.7  0.0 - 4.0 U/mL Final    Comment:   Reference Range:      <4.0  Negative   4.0-6.0  Equivocal      >6.0  Positive    When results are Equivocal, it is recommended to retest after 8-12  weeks. [facility] [address] [facility], [address] [place] [address] [phone]      Protime 06/08/2022 13.0  11.7 - 14.5 sec Final    Comment: Effective 5-25-22 09:00am EST  Please note reference ranges have  changed for PT and INR Testing. INR 06/08/2022 0.99  0.87 - 1.14 Final    Comment: Effective 5/25/22 at 09:00am EST    Normal: 0.87 - 1.14  Therapeutic: 2.0 - 3.0  Pros. Valve: 2.5 - 3.5  AMI: 2.0 - 3.0      Iron 06/08/2022 102  59 - 158 ug/dL Final    TIBC 06/08/2022 416  260 - 445 ug/dL Final    Iron Saturation 06/08/2022 25  20 - 50 % Final       Assessment and Plan:  1. Elevated liver enzymes most likely due to fatty liver and/or alcohol use. His chronic liver lab work up showed no evidence of  autoimmune hepatitis, Hepatitis A, B and C. The patient was encouraged to avoid alcohol and recommend weight loss with low fat diet and daily exercise. 2.  Fatty liver noted on abdominal ultrasound due to obesity from excess calories/metabolic syndrome. His Fib 4 index is 1.59; will order fibroscan for evaluation of liver fibrosis. The patient was encouraged to lose at least 10-15% of his body weight. The patient was provided with information on fatty liver and liver disease diet. 3.  The patient was encouraged to follow-up in 3 months. Total time:  25 minutes.

## 2022-08-11 NOTE — TELEPHONE ENCOUNTER
Spoke with pt informed tried calling his ins d/t covermymeds unable to do prior auth for his plan, was transferred several times then hung up,.  Advised pt to contact his ins for their formulary preference for ozempic

## 2022-08-15 NOTE — TELEPHONE ENCOUNTER
Spoke with deni pt's ins  SFK[employer] IJLX not required. Spoke with [address]. The rx is incorrectec/ 1 pen lasts 4 weeks. Dc'd orig.  Original order sent corrected 1 $79 for 90 day about $ 25 for 30day    Left detailed message for pt

## 2022-08-18 ENCOUNTER — TELEPHONE (OUTPATIENT)
Dept: FAMILY MEDICINE CLINIC | Age: 37
End: 2022-08-18

## 2022-09-13 ENCOUNTER — TELEPHONE (OUTPATIENT)
Dept: FAMILY MEDICINE CLINIC | Age: 37
End: 2022-09-13

## 2022-09-13 ENCOUNTER — TELEPHONE (OUTPATIENT)
Dept: GASTROENTEROLOGY | Age: 37
End: 2022-09-13

## 2022-09-13 NOTE — TELEPHONE ENCOUNTER
REC REQUEST WAS PREVIOUSLY DONE 8/10 TO [facility], CONFIRMATION SHEET RECEIVED-RELEASE PT STATES [facility] TOLD THEM THEY DIDN'T GET REQUEST. 9/13 FAXED RELEASE PT DIRECTLY WITH REC.

## 2022-09-13 NOTE — TELEPHONE ENCOUNTER
Scheduling from [facility] called to let us know that they have attempted to schedule the patient but he declined saying, Dianelys Calvillo was going another route. \"  No test scheduled

## 2023-02-14 ENCOUNTER — OFFICE VISIT (OUTPATIENT)
Dept: FAMILY MEDICINE CLINIC | Age: 38
End: 2023-02-14
Payer: COMMERCIAL

## 2023-02-14 ENCOUNTER — TELEPHONE (OUTPATIENT)
Dept: FAMILY MEDICINE CLINIC | Age: 38
End: 2023-02-14

## 2023-02-14 VITALS
WEIGHT: 260.8 LBS | HEART RATE: 91 BPM | RESPIRATION RATE: 16 BRPM | SYSTOLIC BLOOD PRESSURE: 148 MMHG | BODY MASS INDEX: 35.33 KG/M2 | OXYGEN SATURATION: 97 % | DIASTOLIC BLOOD PRESSURE: 86 MMHG | HEIGHT: 72 IN

## 2023-02-14 DIAGNOSIS — I10 ESSENTIAL HYPERTENSION: ICD-10-CM

## 2023-02-14 DIAGNOSIS — E11.9 TYPE 2 DIABETES MELLITUS WITHOUT COMPLICATION, WITHOUT LONG-TERM CURRENT USE OF INSULIN (HCC): ICD-10-CM

## 2023-02-14 LAB
A/G RATIO: 1.7 (ref 1.1–2.2)
ALBUMIN SERPL-MCNC: 4.6 G/DL (ref 3.4–5)
ALP BLD-CCNC: 53 U/L (ref 40–129)
ALT SERPL-CCNC: 56 U/L (ref 10–40)
ANION GAP SERPL CALCULATED.3IONS-SCNC: 13 MMOL/L (ref 3–16)
AST SERPL-CCNC: 35 U/L (ref 15–37)
BILIRUB SERPL-MCNC: 0.4 MG/DL (ref 0–1)
BUN BLDV-MCNC: 13 MG/DL (ref 7–20)
CALCIUM SERPL-MCNC: 10.2 MG/DL (ref 8.3–10.6)
CHLORIDE BLD-SCNC: 98 MMOL/L (ref 99–110)
CHOLESTEROL, TOTAL: 204 MG/DL (ref 0–199)
CO2: 27 MMOL/L (ref 21–32)
CREAT SERPL-MCNC: 0.6 MG/DL (ref 0.9–1.3)
CREATININE URINE: 48.8 MG/DL (ref 39–259)
GFR SERPL CREATININE-BSD FRML MDRD: >60 ML/MIN/{1.73_M2}
GLUCOSE BLD-MCNC: 343 MG/DL (ref 70–99)
HDLC SERPL-MCNC: 30 MG/DL (ref 40–60)
LDL CHOLESTEROL CALCULATED: ABNORMAL MG/DL
LDL CHOLESTEROL DIRECT: 115 MG/DL
MICROALBUMIN UR-MCNC: 2.1 MG/DL
MICROALBUMIN/CREAT UR-RTO: 43 MG/G (ref 0–30)
POTASSIUM SERPL-SCNC: 4.3 MMOL/L (ref 3.5–5.1)
SODIUM BLD-SCNC: 138 MMOL/L (ref 136–145)
TOTAL PROTEIN: 7.3 G/DL (ref 6.4–8.2)
TRIGL SERPL-MCNC: 426 MG/DL (ref 0–150)
VLDLC SERPL CALC-MCNC: ABNORMAL MG/DL

## 2023-02-14 PROCEDURE — 3046F HEMOGLOBIN A1C LEVEL >9.0%: CPT | Performed by: STUDENT IN AN ORGANIZED HEALTH CARE EDUCATION/TRAINING PROGRAM

## 2023-02-14 PROCEDURE — 3078F DIAST BP <80 MM HG: CPT | Performed by: STUDENT IN AN ORGANIZED HEALTH CARE EDUCATION/TRAINING PROGRAM

## 2023-02-14 PROCEDURE — 3074F SYST BP LT 130 MM HG: CPT | Performed by: STUDENT IN AN ORGANIZED HEALTH CARE EDUCATION/TRAINING PROGRAM

## 2023-02-14 PROCEDURE — 99214 OFFICE O/P EST MOD 30 MIN: CPT | Performed by: STUDENT IN AN ORGANIZED HEALTH CARE EDUCATION/TRAINING PROGRAM

## 2023-02-14 RX ORDER — LOSARTAN POTASSIUM 50 MG/1
50 TABLET ORAL DAILY
Qty: 90 TABLET | Refills: 1 | Status: SHIPPED | OUTPATIENT
Start: 2023-02-14

## 2023-02-14 RX ORDER — ATENOLOL 25 MG/1
TABLET ORAL
Qty: 180 TABLET | Refills: 1 | Status: SHIPPED | OUTPATIENT
Start: 2023-02-14

## 2023-02-14 ASSESSMENT — ENCOUNTER SYMPTOMS
NAUSEA: 0
SORE THROAT: 0
WHEEZING: 0
SHORTNESS OF BREATH: 0
ABDOMINAL PAIN: 0

## 2023-02-14 ASSESSMENT — PATIENT HEALTH QUESTIONNAIRE - PHQ9
SUM OF ALL RESPONSES TO PHQ QUESTIONS 1-9: 0
1. LITTLE INTEREST OR PLEASURE IN DOING THINGS: 0
SUM OF ALL RESPONSES TO PHQ QUESTIONS 1-9: 0
SUM OF ALL RESPONSES TO PHQ9 QUESTIONS 1 & 2: 0
2. FEELING DOWN, DEPRESSED OR HOPELESS: 0

## 2023-02-14 NOTE — TELEPHONE ENCOUNTER
Pt states was at the dentist for tooth extraction. Elevated bp 160/112 .  Asymptomatic scheduled appt w/Dr Loyd 2/14/23 1:45pm

## 2023-02-14 NOTE — PROGRESS NOTES
2/19/2023    [address] [facility]    Chief Complaint   Patient presents with    Other     Elevated bp - asymptomatic   Wouldn't pull tooth today due to blood pressure   Back on trulicity instead of Ozempic        HPI  History was obtained from patient. Afsaneh Richardson is a 40 y.o. male with a PMHx as listed below who presents today for elevated BP. Bp was elevated 160s dental appt  No symptoms  Recall has been high in the past    Pateint on clomid, following [facility]    1. Type 2 diabetes mellitus without complication, without long-term current use of insulin ([facility]    2. Essential hypertension             REVIEW OF SYMPTOMS    Review of Systems   Constitutional:  Negative for chills and fatigue. HENT:  Negative for congestion and sore throat. Respiratory:  Negative for shortness of breath and wheezing. Cardiovascular:  Negative for chest pain and palpitations. Gastrointestinal:  Negative for abdominal pain and nausea. Genitourinary:  Negative for frequency and urgency. Neurological:  Negative for light-headedness.      PAST MEDICAL HISTORY  Past Medical History:   Diagnosis Date    Diabetes mellitus ([facility]     Hyperlipidemia     Hypertension     Obesity        FAMILY HISTORY  Family History   Problem Relation Age of Onset    Diabetes Father     Hypertension Other        SOCIAL HISTORY  Social History     Socioeconomic History    Marital status:    Tobacco Use    Smoking status: Never    Smokeless tobacco: Never   Substance and Sexual Activity    Alcohol use: Yes    Drug use: Yes     Types: Marijuana Hulon Sanchez)        SURGICAL HISTORY  Past Surgical History:   Procedure Laterality Date    FRACTURE SURGERY      JOINT REPLACEMENT      WISDOM TOOTH EXTRACTION                   CURRENT MEDICATIONS  Current Outpatient Medications   Medication Sig Dispense Refill    metFORMIN (GLUCOPHAGE) 1000 MG tablet TAKE ONE TABLET BY MOUTH TWICE A  tablet 1    atenolol (TENORMIN) 25 MG tablet TAKE ONE TABLET BY MOUTH TWICE A  tablet 1    losartan (COZAAR) 50 MG tablet Take 1 tablet by mouth daily 90 tablet 1    blood glucose test strips (ACCU-CHEK GUIDE) strip 1 each by In Vitro route daily As needed. 50 each 5    Accu-Chek Multiclix Lancets MISC Check blood sugar once per day 100 each 5    ACCU-CHEK SOFTCLIX LANCETS MISC 1 each by Does not apply route daily       No current facility-administered medications for this visit. ALLERGIES  Allergies   Allergen Reactions    Orajel Mouth-Aid [Benzocaine] Swelling    Ozempic (0.25 Or 0.5 Mg-Dose) [Semaglutide(0.25 Or 0.5mg-Dos)]        PHYSICAL EXAM    BP (!) 148/86 (Site: Left Upper Arm, Position: Sitting, Cuff Size: Large Adult)   Pulse 91   Resp 16   Ht 6' (1.829 m)   Wt 260 lb 12.8 oz (118.3 kg)   SpO2 97%   BMI 35.37 kg/m²     Physical Exam  Constitutional:       Appearance: Normal appearance. HENT:      Head: Normocephalic and atraumatic. Eyes:      Extraocular Movements: Extraocular movements intact. Pupils: Pupils are equal, round, and reactive to light. Cardiovascular:      Rate and Rhythm: Normal rate and regular rhythm. Pulses: Normal pulses. Heart sounds: No murmur heard. No friction rub. No gallop. Skin:     General: Skin is warm and dry. Neurological:      General: No focal deficit present. Mental Status: He is alert. Psychiatric:         Mood and Affect: Mood normal.         Behavior: Behavior normal.       ASSESSMENT & PLAN    1. Type 2 diabetes mellitus without complication, without long-term current use of insulin (HCC)    - metFORMIN (GLUCOPHAGE) 1000 MG tablet; TAKE ONE TABLET BY MOUTH TWICE A DAY  Dispense: 180 tablet; Refill: 1  - Microalbumin / Creatinine Urine Ratio; Future    2. Essential hypertension    - atenolol (TENORMIN) 25 MG tablet; TAKE ONE TABLET BY MOUTH TWICE A DAY  Dispense: 180 tablet; Refill: 1  - losartan (COZAAR) 50 MG tablet; Take 1 tablet by mouth daily  Dispense: 90 tablet;  Refill: 1    Add losartan has been on in the past without issues  F/u labs  DM2 poor control will need to discuss further at next appt    Return in about 4 weeks (around 3/14/2023) for BP check 2 weeks, 4 weeks appt.          Electronically signed by Riddhi Herman DO on 2/19/2023

## 2023-02-15 LAB
ESTIMATED AVERAGE GLUCOSE: 271.9 MG/DL
HBA1C MFR BLD: 11.1 %

## 2023-02-17 NOTE — RESULT ENCOUNTER NOTE
Please discuss with patient labs show poor control of diabetes and signficant elevation in TG's. I would like to start the patient on ozempic once weekly injection (not insulin) and for TG's start patient on fenofibrte 145mg every evening.  We will discuss further at next appointment

## 2023-02-23 NOTE — RESULT ENCOUNTER NOTE
Fenofibrate is for how high his triglyceride levels have been. I do not see he is currently on trulicity.  I would like to restart trulicity 3mg if ok with patient

## 2023-03-02 ENCOUNTER — NURSE ONLY (OUTPATIENT)
Dept: FAMILY MEDICINE CLINIC | Age: 38
End: 2023-03-02

## 2023-03-02 VITALS — SYSTOLIC BLOOD PRESSURE: 184 MMHG | DIASTOLIC BLOOD PRESSURE: 102 MMHG

## 2023-03-02 DIAGNOSIS — I10 HYPERTENSION, UNSPECIFIED TYPE: Primary | ICD-10-CM

## 2023-03-02 DIAGNOSIS — I10 ESSENTIAL HYPERTENSION: Primary | ICD-10-CM

## 2023-03-02 PROCEDURE — 99999 PR OFFICE/OUTPT VISIT,PROCEDURE ONLY: CPT | Performed by: STUDENT IN AN ORGANIZED HEALTH CARE EDUCATION/TRAINING PROGRAM

## 2023-03-02 RX ORDER — CHLORTHALIDONE 25 MG/1
25 TABLET ORAL DAILY
Qty: 30 TABLET | Refills: 2 | Status: SHIPPED | OUTPATIENT
Start: 2023-03-02 | End: 2023-05-31

## 2023-03-02 NOTE — PROGRESS NOTES
Patient in for BP check. Pressure check and documented. Patient sat for 15 minutes and rechecked. Dr Isadora Davis notified of BP's. He is going to add chlorthaladone 25mg daily and will recheck at follow up appointment. Patient notified and expressed understanding.

## 2023-03-15 ENCOUNTER — OFFICE VISIT (OUTPATIENT)
Dept: FAMILY MEDICINE CLINIC | Age: 38
End: 2023-03-15
Payer: COMMERCIAL

## 2023-03-15 VITALS
WEIGHT: 248 LBS | BODY MASS INDEX: 33.59 KG/M2 | HEIGHT: 72 IN | SYSTOLIC BLOOD PRESSURE: 145 MMHG | DIASTOLIC BLOOD PRESSURE: 95 MMHG | HEART RATE: 105 BPM | OXYGEN SATURATION: 97 %

## 2023-03-15 DIAGNOSIS — I10 ESSENTIAL HYPERTENSION: ICD-10-CM

## 2023-03-15 DIAGNOSIS — E78.5 HYPERLIPIDEMIA, UNSPECIFIED HYPERLIPIDEMIA TYPE: Primary | ICD-10-CM

## 2023-03-15 DIAGNOSIS — E11.9 TYPE 2 DIABETES MELLITUS WITHOUT COMPLICATION, WITHOUT LONG-TERM CURRENT USE OF INSULIN (HCC): Chronic | ICD-10-CM

## 2023-03-15 PROCEDURE — 3046F HEMOGLOBIN A1C LEVEL >9.0%: CPT | Performed by: STUDENT IN AN ORGANIZED HEALTH CARE EDUCATION/TRAINING PROGRAM

## 2023-03-15 PROCEDURE — 99213 OFFICE O/P EST LOW 20 MIN: CPT | Performed by: STUDENT IN AN ORGANIZED HEALTH CARE EDUCATION/TRAINING PROGRAM

## 2023-03-15 PROCEDURE — 3078F DIAST BP <80 MM HG: CPT | Performed by: STUDENT IN AN ORGANIZED HEALTH CARE EDUCATION/TRAINING PROGRAM

## 2023-03-15 PROCEDURE — 3074F SYST BP LT 130 MM HG: CPT | Performed by: STUDENT IN AN ORGANIZED HEALTH CARE EDUCATION/TRAINING PROGRAM

## 2023-03-15 RX ORDER — LOSARTAN POTASSIUM 50 MG/1
100 TABLET ORAL DAILY
Qty: 90 TABLET | Refills: 1 | Status: SHIPPED | OUTPATIENT
Start: 2023-03-15 | End: 2023-09-11

## 2023-03-15 ASSESSMENT — ENCOUNTER SYMPTOMS
SHORTNESS OF BREATH: 0
ABDOMINAL PAIN: 0
NAUSEA: 0
SORE THROAT: 0
WHEEZING: 0

## 2023-03-15 NOTE — PROGRESS NOTES
3/15/2023    [facility]    Chief Complaint   Patient presents with    1 Month Follow-Up     Htn  - no complaints         HPI  History was obtained from pateint. Brooklynn Chaidez is a 40 y.o. male with a PMHx as listed below who presents today for follow up elevated bp, diabetes. Last appt added back losartan 50mg, chlorthalidone 25mg,  atenolol 25mg,   No side effects     Now on trulicity glucose 567F AM, in afternoon 110    Plan for surgery in June, sperm extraction. patient actively trying to conceive with wife. 1. Hyperlipidemia, unspecified hyperlipidemia type    2. Essential hypertension    3. Type 2 diabetes mellitus without complication, without long-term current use of insulin ([facility])             REVIEW OF SYMPTOMS    Review of Systems   Constitutional:  Negative for chills and fatigue. HENT:  Negative for congestion and sore throat. Respiratory:  Negative for shortness of breath and wheezing. Cardiovascular:  Negative for chest pain and palpitations. Gastrointestinal:  Negative for abdominal pain and nausea. Genitourinary:  Negative for frequency and urgency. Neurological:  Negative for light-headedness.      PAST MEDICAL HISTORY  Past Medical History:   Diagnosis Date    Diabetes mellitus ([address]     Hyperlipidemia     Hypertension     Obesity        FAMILY HISTORY  Family History   Problem Relation Age of Onset    Diabetes Father     Hypertension Other        SOCIAL HISTORY  Social History     Socioeconomic History    Marital status:    Tobacco Use    Smoking status: Never    Smokeless tobacco: Never   Substance and Sexual Activity    Alcohol use: Yes    Drug use: Yes     Types: Marijuana Laquita Fuller)        SURGICAL HISTORY  Past Surgical History:   Procedure Laterality Date    FRACTURE SURGERY      JOINT REPLACEMENT      WISDOM TOOTH EXTRACTION                   CURRENT MEDICATIONS  Current Outpatient Medications   Medication Sig Dispense Refill    losartan (COZAAR) 50 MG tablet Take 2 tablets by mouth daily 90 tablet 1    chlorthalidone (HYGROTON) 25 MG tablet Take 1 tablet by mouth daily 30 tablet 2    Dulaglutide 3 MG/0.5ML SOPN Inject 3 mg into the skin once a week 2 mL 2    metFORMIN (GLUCOPHAGE) 1000 MG tablet TAKE ONE TABLET BY MOUTH TWICE A  tablet 1    atenolol (TENORMIN) 25 MG tablet TAKE ONE TABLET BY MOUTH TWICE A  tablet 1    blood glucose test strips (ACCU-CHEK GUIDE) strip 1 each by In Vitro route daily As needed. 50 each 5    Accu-Chek Multiclix Lancets MISC Check blood sugar once per day 100 each 5    ACCU-CHEK SOFTCLIX LANCETS MISC 1 each by Does not apply route daily       No current facility-administered medications for this visit. ALLERGIES  Allergies   Allergen Reactions    Orajel Mouth-Aid [Benzocaine] Swelling    Ozempic (0.25 Or 0.5 Mg-Dose) [Semaglutide(0.25 Or 0.5mg-Dos)]      Bad gas       PHYSICAL EXAM    BP (!) 145/95 (Site: Left Upper Arm)   Pulse (!) 105   Ht 6' (1.829 m)   Wt 248 lb (112.5 kg)   SpO2 97%   BMI 33.63 kg/m²     Physical Exam  Constitutional:       Appearance: Normal appearance. HENT:      Head: Normocephalic and atraumatic. Eyes:      Extraocular Movements: Extraocular movements intact. Pupils: Pupils are equal, round, and reactive to light. Cardiovascular:      Rate and Rhythm: Normal rate and regular rhythm. Pulses: Normal pulses. Heart sounds: No murmur heard. No friction rub. No gallop. Pulmonary:      Effort: Pulmonary effort is normal.      Breath sounds: Normal breath sounds. Skin:     General: Skin is warm and dry. Neurological:      General: No focal deficit present. Mental Status: He is alert. Psychiatric:         Mood and Affect: Mood normal.         Behavior: Behavior normal.       ASSESSMENT & PLAN    1. Essential hypertension    - losartan (COZAAR) 50 MG tablet; Take 2 tablets by mouth daily  Dispense: 90 tablet; Refill: 1    2.  Hyperlipidemia, unspecified hyperlipidemia type    - Lipid, Fasting; Future    3. Type 2 diabetes mellitus without complication, without long-term current use of insulin ([facility])      BP elevated mildly increase losartan  HLD high Tgs we will repeat  DM2 trending down  Return in about 2 months (around 5/22/2023) for any time after May 15th.          Electronically signed by Viridiana Zavaleta DO on 3/15/2023

## 2023-04-26 ENCOUNTER — TELEPHONE (OUTPATIENT)
Dept: FAMILY MEDICINE CLINIC | Age: 38
End: 2023-04-26

## 2023-04-26 NOTE — TELEPHONE ENCOUNTER
----- Message from Lena Lara sent at 4/25/2023  4:39 PM EDT -----  Regarding: Trulicity  Contact: [phone]  Hello! Jennifer attempted to get my trulicity rx refilled through [facility] for the past 2 weeks and they are telling me they are unable to fill it due to a prior authorization that needs complete by the ordering MD? Is there anyway you can check on this for me as Terrie Devi gone without my Rx for over a week now. Thank you!

## 2023-04-26 NOTE — TELEPHONE ENCOUNTER
Called Patient and informed him this has already been approved from 03/27/23-04/25/24.  Voiced understanding

## 2023-05-24 DIAGNOSIS — I10 ESSENTIAL HYPERTENSION: ICD-10-CM

## 2023-05-24 RX ORDER — LOSARTAN POTASSIUM 50 MG/1
100 TABLET ORAL DAILY
Qty: 180 TABLET | Refills: 1 | Status: SHIPPED | OUTPATIENT
Start: 2023-05-24 | End: 2023-11-20

## 2023-05-27 DIAGNOSIS — I10 ESSENTIAL HYPERTENSION: ICD-10-CM

## 2023-05-30 RX ORDER — CHLORTHALIDONE 25 MG/1
TABLET ORAL
Qty: 30 TABLET | Refills: 2 | Status: SHIPPED | OUTPATIENT
Start: 2023-05-30

## 2023-06-23 RX ORDER — DULAGLUTIDE 3 MG/.5ML
INJECTION, SOLUTION SUBCUTANEOUS
Qty: 2 ML | Refills: 2 | Status: SHIPPED | OUTPATIENT
Start: 2023-06-23

## 2023-07-24 ENCOUNTER — OFFICE VISIT (OUTPATIENT)
Dept: FAMILY MEDICINE CLINIC | Age: 38
End: 2023-07-24
Payer: COMMERCIAL

## 2023-07-24 VITALS
RESPIRATION RATE: 16 BRPM | HEIGHT: 72 IN | OXYGEN SATURATION: 98 % | BODY MASS INDEX: 32.64 KG/M2 | SYSTOLIC BLOOD PRESSURE: 128 MMHG | WEIGHT: 241 LBS | DIASTOLIC BLOOD PRESSURE: 88 MMHG | HEART RATE: 87 BPM

## 2023-07-24 DIAGNOSIS — E11.9 TYPE 2 DIABETES MELLITUS WITHOUT COMPLICATION, WITHOUT LONG-TERM CURRENT USE OF INSULIN (HCC): ICD-10-CM

## 2023-07-24 DIAGNOSIS — I10 ESSENTIAL HYPERTENSION: ICD-10-CM

## 2023-07-24 PROCEDURE — 99213 OFFICE O/P EST LOW 20 MIN: CPT | Performed by: STUDENT IN AN ORGANIZED HEALTH CARE EDUCATION/TRAINING PROGRAM

## 2023-07-24 PROCEDURE — 3078F DIAST BP <80 MM HG: CPT | Performed by: STUDENT IN AN ORGANIZED HEALTH CARE EDUCATION/TRAINING PROGRAM

## 2023-07-24 PROCEDURE — 3046F HEMOGLOBIN A1C LEVEL >9.0%: CPT | Performed by: STUDENT IN AN ORGANIZED HEALTH CARE EDUCATION/TRAINING PROGRAM

## 2023-07-24 PROCEDURE — 3074F SYST BP LT 130 MM HG: CPT | Performed by: STUDENT IN AN ORGANIZED HEALTH CARE EDUCATION/TRAINING PROGRAM

## 2023-07-24 RX ORDER — ATENOLOL 25 MG/1
TABLET ORAL
Qty: 180 TABLET | Refills: 1 | Status: SHIPPED | OUTPATIENT
Start: 2023-07-24

## 2023-07-24 RX ORDER — LANCETS
EACH MISCELLANEOUS
Qty: 100 EACH | Refills: 5 | Status: SHIPPED | OUTPATIENT
Start: 2023-07-24

## 2023-07-24 RX ORDER — LOSARTAN POTASSIUM 50 MG/1
100 TABLET ORAL DAILY
Qty: 180 TABLET | Refills: 1 | Status: SHIPPED | OUTPATIENT
Start: 2023-07-24 | End: 2024-01-20

## 2023-07-24 RX ORDER — CHLORTHALIDONE 25 MG/1
25 TABLET ORAL DAILY
Qty: 30 TABLET | Refills: 2 | Status: SHIPPED | OUTPATIENT
Start: 2023-07-24

## 2023-07-24 RX ORDER — DULAGLUTIDE 3 MG/.5ML
INJECTION, SOLUTION SUBCUTANEOUS
Qty: 2 ML | Refills: 2 | Status: SHIPPED | OUTPATIENT
Start: 2023-07-24

## 2023-07-24 RX ORDER — BLOOD SUGAR DIAGNOSTIC
1 STRIP MISCELLANEOUS DAILY
Qty: 50 EACH | Refills: 5 | Status: SHIPPED | OUTPATIENT
Start: 2023-07-24

## 2023-07-24 SDOH — ECONOMIC STABILITY: INCOME INSECURITY: HOW HARD IS IT FOR YOU TO PAY FOR THE VERY BASICS LIKE FOOD, HOUSING, MEDICAL CARE, AND HEATING?: NOT HARD AT ALL

## 2023-07-24 SDOH — ECONOMIC STABILITY: FOOD INSECURITY: WITHIN THE PAST 12 MONTHS, YOU WORRIED THAT YOUR FOOD WOULD RUN OUT BEFORE YOU GOT MONEY TO BUY MORE.: NEVER TRUE

## 2023-07-24 SDOH — ECONOMIC STABILITY: HOUSING INSECURITY
IN THE LAST 12 MONTHS, WAS THERE A TIME WHEN YOU DID NOT HAVE A STEADY PLACE TO SLEEP OR SLEPT IN A SHELTER (INCLUDING NOW)?: NO

## 2023-07-24 SDOH — ECONOMIC STABILITY: FOOD INSECURITY: WITHIN THE PAST 12 MONTHS, THE FOOD YOU BOUGHT JUST DIDN'T LAST AND YOU DIDN'T HAVE MONEY TO GET MORE.: NEVER TRUE

## 2023-07-24 NOTE — PROGRESS NOTES
7/26/2023    [facility]    Chief Complaint   Patient presents with    Follow-up     2 MO       HPI  History was obtained from patient. Alecia Camejo is a 45 y.o. male with a PMHx as listed below who presents today for follow up DM2    Hasn't been checking glucose at home    Complaint with trulicity, metformin, poor control dm2 last a1c 2/14/23 due for repeat labs this week  1. Type 2 diabetes mellitus without complication, without long-term current use of insulin ([address])    2. Essential hypertension             REVIEW OF SYMPTOMS    Review of Systems   Constitutional:  Negative for chills and fatigue. HENT:  Negative for congestion and sore throat. Respiratory:  Negative for shortness of breath and wheezing. Cardiovascular:  Negative for chest pain and palpitations. Gastrointestinal:  Negative for abdominal pain and nausea. Genitourinary:  Negative for frequency and urgency. Neurological:  Negative for light-headedness.      PAST MEDICAL HISTORY  Past Medical History:   Diagnosis Date    Diabetes mellitus ([address])     Hyperlipidemia     Hypertension     Obesity        FAMILY HISTORY  Family History   Problem Relation Age of Onset    Diabetes Father     Hypertension Other        SOCIAL HISTORY  Social History     Socioeconomic History    Marital status:    Tobacco Use    Smoking status: Never    Smokeless tobacco: Never   Substance and Sexual Activity    Alcohol use: Yes    Drug use: Yes     Types: Marijuana Elspeth Squanel)     Social Determinants of Health     Financial Resource Strain: Low Risk     Difficulty of Paying Living Expenses: Not hard at all   Food Insecurity: No Food Insecurity    Worried About [place] in the Last Year: Never true    Ran Out of Food in the Last Year: Never true   Transportation Needs: Unknown    Lack of Transportation (Non-Medical): No   Housing Stability: Unknown    Unstable Housing in the Last Year: No        SURGICAL HISTORY  Past Surgical History:   Procedure

## 2023-07-26 ASSESSMENT — ENCOUNTER SYMPTOMS
SORE THROAT: 0
ABDOMINAL PAIN: 0
SHORTNESS OF BREATH: 0
WHEEZING: 0
NAUSEA: 0

## 2023-07-27 DIAGNOSIS — E11.9 TYPE 2 DIABETES MELLITUS WITHOUT COMPLICATION, WITHOUT LONG-TERM CURRENT USE OF INSULIN (HCC): ICD-10-CM

## 2023-07-27 DIAGNOSIS — E78.5 HYPERLIPIDEMIA, UNSPECIFIED HYPERLIPIDEMIA TYPE: ICD-10-CM

## 2023-07-27 LAB
ANION GAP SERPL CALCULATED.3IONS-SCNC: 13 MMOL/L (ref 3–16)
BUN SERPL-MCNC: 15 MG/DL (ref 7–20)
CALCIUM SERPL-MCNC: 9.9 MG/DL (ref 8.3–10.6)
CHLORIDE SERPL-SCNC: 97 MMOL/L (ref 99–110)
CHOLEST SERPL-MCNC: 184 MG/DL (ref 0–199)
CO2 SERPL-SCNC: 28 MMOL/L (ref 21–32)
CREAT SERPL-MCNC: 0.8 MG/DL (ref 0.9–1.3)
GFR SERPLBLD CREATININE-BSD FMLA CKD-EPI: >60 ML/MIN/{1.73_M2}
GLUCOSE SERPL-MCNC: 242 MG/DL (ref 70–99)
HDLC SERPL-MCNC: 31 MG/DL (ref 40–60)
LDL CHOLESTEROL CALCULATED: ABNORMAL MG/DL
LDLC SERPL-MCNC: 101 MG/DL
POTASSIUM SERPL-SCNC: 3.2 MMOL/L (ref 3.5–5.1)
SODIUM SERPL-SCNC: 138 MMOL/L (ref 136–145)
TRIGL SERPL-MCNC: 338 MG/DL (ref 0–150)
VLDLC SERPL CALC-MCNC: ABNORMAL MG/DL

## 2023-07-28 LAB
EST. AVERAGE GLUCOSE BLD GHB EST-MCNC: 271.9 MG/DL
HBA1C MFR BLD: 11.1 %

## 2023-07-31 NOTE — RESULT ENCOUNTER NOTE
Please discuss with patient labs show very poor control diabetes. Has he been checking his glucose at home? I would like to add glipizide xl to his regimen 10mg f/u change to early November.  If glucose above 180 call office after starting regimen

## 2023-08-04 ENCOUNTER — TELEPHONE (OUTPATIENT)
Dept: FAMILY MEDICINE CLINIC | Age: 38
End: 2023-08-04

## 2023-08-07 ENCOUNTER — TELEPHONE (OUTPATIENT)
Dept: FAMILY MEDICINE CLINIC | Age: 38
End: 2023-08-07

## 2023-08-07 NOTE — TELEPHONE ENCOUNTER
----- Message from [facility]Sent[facility] CenterDO sent at 8/4/2023  8:43 AM EDT -----  Please try to call patient again to discuss elevation in glucose see prior msg

## 2023-08-07 NOTE — TELEPHONE ENCOUNTER
Left detailed message on patient identified voicemail. Instructed to contact the office. Please discuss with patient labs show very poor control diabetes. Has he been checking his glucose at home? I would like to add glipizide xl to his regimen 10mg f/u change to early November.  If glucose above 180 call office after starting regimen

## 2023-08-23 ENCOUNTER — TELEPHONE (OUTPATIENT)
Dept: FAMILY MEDICINE CLINIC | Age: 38
End: 2023-08-23

## 2023-08-23 DIAGNOSIS — E11.9 TYPE 2 DIABETES MELLITUS WITHOUT COMPLICATION, WITHOUT LONG-TERM CURRENT USE OF INSULIN (HCC): Primary | ICD-10-CM

## 2023-08-23 RX ORDER — GLIPIZIDE 10 MG/1
10 TABLET, FILM COATED, EXTENDED RELEASE ORAL DAILY
Qty: 30 TABLET | Refills: 3 | Status: SHIPPED | OUTPATIENT
Start: 2023-08-23

## 2023-08-23 NOTE — TELEPHONE ENCOUNTER
PT returned call concerning uncontrolled DM , pt is agreeable to starting Glipizide . Please send to [facility].

## 2023-08-25 DIAGNOSIS — I10 ESSENTIAL HYPERTENSION: ICD-10-CM

## 2023-08-28 RX ORDER — CHLORTHALIDONE 25 MG/1
TABLET ORAL
Qty: 90 TABLET | Refills: 1 | Status: SHIPPED | OUTPATIENT
Start: 2023-08-28

## 2023-09-16 DIAGNOSIS — E11.9 TYPE 2 DIABETES MELLITUS WITHOUT COMPLICATION, WITHOUT LONG-TERM CURRENT USE OF INSULIN (HCC): ICD-10-CM

## 2023-09-18 RX ORDER — GLIPIZIDE 10 MG/1
10 TABLET, FILM COATED, EXTENDED RELEASE ORAL DAILY
Qty: 90 TABLET | Refills: 1 | Status: SHIPPED | OUTPATIENT
Start: 2023-09-18

## 2024-02-07 DIAGNOSIS — E11.9 TYPE 2 DIABETES MELLITUS WITHOUT COMPLICATION, WITHOUT LONG-TERM CURRENT USE OF INSULIN (HCC): ICD-10-CM

## 2024-02-08 RX ORDER — DULAGLUTIDE 3 MG/.5ML
INJECTION, SOLUTION SUBCUTANEOUS
Qty: 12 ADJUSTABLE DOSE PRE-FILLED PEN SYRINGE | Refills: 1 | Status: SHIPPED | OUTPATIENT
Start: 2024-02-08

## 2024-02-29 DIAGNOSIS — I10 ESSENTIAL HYPERTENSION: ICD-10-CM

## 2024-02-29 RX ORDER — LOSARTAN POTASSIUM 50 MG/1
100 TABLET ORAL DAILY
Qty: 180 TABLET | Refills: 1 | OUTPATIENT
Start: 2024-02-29 | End: 2024-08-27

## 2024-03-14 DIAGNOSIS — E11.9 TYPE 2 DIABETES MELLITUS WITHOUT COMPLICATION, WITHOUT LONG-TERM CURRENT USE OF INSULIN (HCC): ICD-10-CM

## 2024-03-14 DIAGNOSIS — I10 ESSENTIAL HYPERTENSION: ICD-10-CM

## 2024-03-14 RX ORDER — ATENOLOL 25 MG/1
TABLET ORAL
Qty: 180 TABLET | Refills: 1 | Status: SHIPPED | OUTPATIENT
Start: 2024-03-14

## 2024-03-25 DIAGNOSIS — I10 ESSENTIAL HYPERTENSION: ICD-10-CM

## 2024-03-25 DIAGNOSIS — E11.9 TYPE 2 DIABETES MELLITUS WITHOUT COMPLICATION, WITHOUT LONG-TERM CURRENT USE OF INSULIN (HCC): ICD-10-CM

## 2024-03-25 RX ORDER — GLIPIZIDE 10 MG/1
10 TABLET, FILM COATED, EXTENDED RELEASE ORAL DAILY
Qty: 90 TABLET | Refills: 1 | OUTPATIENT
Start: 2024-03-25

## 2024-03-25 RX ORDER — LOSARTAN POTASSIUM 50 MG/1
100 TABLET ORAL DAILY
Qty: 180 TABLET | Refills: 1 | OUTPATIENT
Start: 2024-03-25 | End: 2024-09-21

## 2024-04-06 DIAGNOSIS — I10 ESSENTIAL HYPERTENSION: ICD-10-CM

## 2024-04-08 RX ORDER — CHLORTHALIDONE 25 MG/1
TABLET ORAL
Qty: 90 TABLET | Refills: 1 | OUTPATIENT
Start: 2024-04-08

## 2024-04-15 DIAGNOSIS — I10 ESSENTIAL HYPERTENSION: ICD-10-CM

## 2024-04-15 DIAGNOSIS — E11.9 TYPE 2 DIABETES MELLITUS WITHOUT COMPLICATION, WITHOUT LONG-TERM CURRENT USE OF INSULIN (HCC): ICD-10-CM

## 2024-04-15 RX ORDER — LOSARTAN POTASSIUM 50 MG/1
100 TABLET ORAL DAILY
Qty: 180 TABLET | Refills: 1 | Status: SHIPPED | OUTPATIENT
Start: 2024-04-15 | End: 2024-10-12

## 2024-04-15 RX ORDER — DULAGLUTIDE 3 MG/.5ML
INJECTION, SOLUTION SUBCUTANEOUS
Qty: 12 ADJUSTABLE DOSE PRE-FILLED PEN SYRINGE | Refills: 1 | Status: SHIPPED | OUTPATIENT
Start: 2024-04-15

## 2024-04-15 RX ORDER — GLIPIZIDE 10 MG/1
10 TABLET, FILM COATED, EXTENDED RELEASE ORAL DAILY
Qty: 90 TABLET | Refills: 1 | Status: SHIPPED | OUTPATIENT
Start: 2024-04-15

## 2024-04-15 RX ORDER — ATENOLOL 25 MG/1
TABLET ORAL
Qty: 180 TABLET | Refills: 1 | Status: SHIPPED | OUTPATIENT
Start: 2024-04-15

## 2024-04-15 RX ORDER — CHLORTHALIDONE 25 MG/1
25 TABLET ORAL DAILY
Qty: 90 TABLET | Refills: 1 | Status: SHIPPED | OUTPATIENT
Start: 2024-04-15 | End: 2024-10-12

## 2024-05-23 ENCOUNTER — PATIENT MESSAGE (OUTPATIENT)
Dept: FAMILY MEDICINE CLINIC | Age: 39
End: 2024-05-23

## 2024-05-23 DIAGNOSIS — E11.9 TYPE 2 DIABETES MELLITUS WITHOUT COMPLICATION, WITHOUT LONG-TERM CURRENT USE OF INSULIN (HCC): ICD-10-CM

## 2024-05-24 RX ORDER — DULAGLUTIDE 3 MG/.5ML
INJECTION, SOLUTION SUBCUTANEOUS
Qty: 4 ADJUSTABLE DOSE PRE-FILLED PEN SYRINGE | Refills: 0 | Status: SHIPPED | OUTPATIENT
Start: 2024-05-24

## 2024-05-24 NOTE — TELEPHONE ENCOUNTER
This request has been approved using information available on the patient's profile. CaseId:[id number];Status:Approved;Review Type:Prior Auth;Coverage Start Date:04/23/2024;Coverage End Date:05/23/2025; Spoke with patient to let him know, patient verbalized understanding.

## 2024-05-31 NOTE — TELEPHONE ENCOUNTER
Unfortunately yes medication is not available. We can switch to rybelsus oral pill daily has to take on empty stomach on own no medication/food for 30 minutes

## 2024-09-03 DIAGNOSIS — I10 ESSENTIAL HYPERTENSION: ICD-10-CM

## 2024-09-03 NOTE — RESULT ENCOUNTER NOTE
Comments: lm for f/u appt     Last Office Visit (last PCP visit):   12/7/2023    Next Visit Date:  No future appointments.    **If hasn't been seen in over a year OR hasn't followed up according to last diabetes/ADHD visit, make appointment for patient before sending refill to provider.    Rx requested:  Requested Prescriptions     Pending Prescriptions Disp Refills    meloxicam (MOBIC) 15 MG tablet [Pharmacy Med Name: Meloxicam Oral Tablet 15 MG] 30 tablet 0     Sig: TAKE ONE TABLET BY MOUTH EVERY DAY    QUEtiapine (SEROQUEL) 200 MG tablet [Pharmacy Med Name: QUEtiapine Fumarate Oral Tablet 200 MG] 30 tablet 0     Sig: TAKE ONE TABLET BY MOUTH in the evening                Please discuss with patietn he has some liver enlargement, given elevation in LFTs we will send to GI for further evaluation.

## 2024-09-05 RX ORDER — LOSARTAN POTASSIUM 50 MG/1
100 TABLET ORAL DAILY
Qty: 180 TABLET | Refills: 1 | Status: SHIPPED | OUTPATIENT
Start: 2024-09-05

## 2024-11-28 DIAGNOSIS — E11.9 TYPE 2 DIABETES MELLITUS WITHOUT COMPLICATION, WITHOUT LONG-TERM CURRENT USE OF INSULIN (HCC): ICD-10-CM

## 2025-03-04 DIAGNOSIS — E11.9 TYPE 2 DIABETES MELLITUS WITHOUT COMPLICATION, WITHOUT LONG-TERM CURRENT USE OF INSULIN (HCC): ICD-10-CM

## 2025-03-04 DIAGNOSIS — I10 ESSENTIAL HYPERTENSION: ICD-10-CM

## 2025-03-04 RX ORDER — CHLORTHALIDONE 25 MG/1
25 TABLET ORAL DAILY
Qty: 90 TABLET | Refills: 1 | OUTPATIENT
Start: 2025-03-04

## 2025-03-04 RX ORDER — GLIPIZIDE 10 MG/1
10 TABLET, FILM COATED, EXTENDED RELEASE ORAL DAILY
Qty: 90 TABLET | Refills: 1 | OUTPATIENT
Start: 2025-03-04

## 2025-04-03 DIAGNOSIS — E11.9 TYPE 2 DIABETES MELLITUS WITHOUT COMPLICATION, WITHOUT LONG-TERM CURRENT USE OF INSULIN: ICD-10-CM

## 2025-04-03 DIAGNOSIS — I10 ESSENTIAL HYPERTENSION: ICD-10-CM

## 2025-04-03 RX ORDER — CHLORTHALIDONE 25 MG/1
25 TABLET ORAL DAILY
Qty: 90 TABLET | Refills: 1 | OUTPATIENT
Start: 2025-04-03

## 2025-04-03 RX ORDER — GLIPIZIDE 10 MG/1
10 TABLET, FILM COATED, EXTENDED RELEASE ORAL DAILY
Qty: 90 TABLET | Refills: 1 | OUTPATIENT
Start: 2025-04-03

## 2025-06-29 DIAGNOSIS — I10 ESSENTIAL HYPERTENSION: ICD-10-CM

## 2025-06-30 RX ORDER — LOSARTAN POTASSIUM 50 MG/1
100 TABLET ORAL DAILY
Qty: 180 TABLET | Refills: 1 | OUTPATIENT
Start: 2025-06-30